# Patient Record
Sex: MALE | Race: WHITE | Employment: OTHER | URBAN - METROPOLITAN AREA
[De-identification: names, ages, dates, MRNs, and addresses within clinical notes are randomized per-mention and may not be internally consistent; named-entity substitution may affect disease eponyms.]

---

## 2021-02-12 ENCOUNTER — IMMUNIZATION (OUTPATIENT)
Dept: PRIMARY CARE CLINIC | Age: 71
End: 2021-02-12
Payer: MEDICARE

## 2021-02-12 PROCEDURE — 0011A PR IMM ADMN SARSCOV2 100 MCG/0.5 ML 1ST DOSE: CPT | Performed by: PHYSICIAN ASSISTANT

## 2021-02-12 PROCEDURE — 91301 COVID-19, MODERNA VACCINE 100MCG/0.5ML DOSE: CPT | Performed by: PHYSICIAN ASSISTANT

## 2021-03-15 ENCOUNTER — IMMUNIZATION (OUTPATIENT)
Dept: PRIMARY CARE CLINIC | Age: 71
End: 2021-03-15
Payer: MEDICARE

## 2021-03-15 PROCEDURE — 91301 COVID-19, MODERNA VACCINE 100MCG/0.5ML DOSE: CPT | Performed by: NURSE PRACTITIONER

## 2021-03-15 PROCEDURE — 0012A COVID-19, MODERNA VACCINE 100MCG/0.5ML DOSE: CPT | Performed by: NURSE PRACTITIONER

## 2021-10-08 ENCOUNTER — HOSPITAL ENCOUNTER (EMERGENCY)
Age: 71
Discharge: ANOTHER HOSPITAL | End: 2021-10-09
Attending: EMERGENCY MEDICINE
Payer: MEDICARE

## 2021-10-08 ENCOUNTER — APPOINTMENT (OUTPATIENT)
Dept: GENERAL RADIOLOGY | Age: 71
End: 2021-10-08
Payer: MEDICARE
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
## 2021-10-08 DIAGNOSIS — S72.001A CLOSED FRACTURE OF RIGHT HIP, INITIAL ENCOUNTER (HCC): Primary | ICD-10-CM

## 2021-10-08 DIAGNOSIS — M25.551 RIGHT HIP PAIN: ICD-10-CM

## 2021-10-08 DIAGNOSIS — S51.011A ELBOW LACERATION, RIGHT, INITIAL ENCOUNTER: ICD-10-CM

## 2021-10-08 LAB
ABO/RH: NORMAL
ALBUMIN SERPL-MCNC: 4.6 G/DL (ref 3.5–5.2)
ALP BLD-CCNC: 72 U/L (ref 40–129)
ALT SERPL-CCNC: 28 U/L (ref 0–40)
ANION GAP SERPL CALCULATED.3IONS-SCNC: 10 MMOL/L (ref 7–16)
ANTIBODY SCREEN: NORMAL
APTT: 26.8 SEC (ref 24.5–35.1)
AST SERPL-CCNC: 30 U/L (ref 0–39)
BASOPHILS ABSOLUTE: 0.07 E9/L (ref 0–0.2)
BASOPHILS RELATIVE PERCENT: 0.6 % (ref 0–2)
BILIRUB SERPL-MCNC: 1.1 MG/DL (ref 0–1.2)
BUN BLDV-MCNC: 18 MG/DL (ref 6–23)
CALCIUM SERPL-MCNC: 10 MG/DL (ref 8.6–10.2)
CHLORIDE BLD-SCNC: 104 MMOL/L (ref 98–107)
CO2: 25 MMOL/L (ref 22–29)
CREAT SERPL-MCNC: 1.1 MG/DL (ref 0.7–1.2)
EOSINOPHILS ABSOLUTE: 0.07 E9/L (ref 0.05–0.5)
EOSINOPHILS RELATIVE PERCENT: 0.6 % (ref 0–6)
GFR AFRICAN AMERICAN: >60
GFR NON-AFRICAN AMERICAN: >60 ML/MIN/1.73
GLUCOSE BLD-MCNC: 131 MG/DL (ref 74–99)
HCT VFR BLD CALC: 46.6 % (ref 37–54)
HEMOGLOBIN: 15.8 G/DL (ref 12.5–16.5)
IMMATURE GRANULOCYTES #: 0.09 E9/L
IMMATURE GRANULOCYTES %: 0.7 % (ref 0–5)
INR BLD: 1.1
LYMPHOCYTES ABSOLUTE: 0.93 E9/L (ref 1.5–4)
LYMPHOCYTES RELATIVE PERCENT: 7.6 % (ref 20–42)
MCH RBC QN AUTO: 30.7 PG (ref 26–35)
MCHC RBC AUTO-ENTMCNC: 33.9 % (ref 32–34.5)
MCV RBC AUTO: 90.7 FL (ref 80–99.9)
MONOCYTES ABSOLUTE: 0.96 E9/L (ref 0.1–0.95)
MONOCYTES RELATIVE PERCENT: 7.9 % (ref 2–12)
NEUTROPHILS ABSOLUTE: 10.1 E9/L (ref 1.8–7.3)
NEUTROPHILS RELATIVE PERCENT: 82.6 % (ref 43–80)
PDW BLD-RTO: 13 FL (ref 11.5–15)
PLATELET # BLD: 104 E9/L (ref 130–450)
PMV BLD AUTO: 11.9 FL (ref 7–12)
POTASSIUM REFLEX MAGNESIUM: 4.5 MMOL/L (ref 3.5–5)
PROTHROMBIN TIME: 12.5 SEC (ref 9.3–12.4)
RBC # BLD: 5.14 E12/L (ref 3.8–5.8)
SODIUM BLD-SCNC: 139 MMOL/L (ref 132–146)
TOTAL PROTEIN: 7.8 G/DL (ref 6.4–8.3)
WBC # BLD: 12.2 E9/L (ref 4.5–11.5)

## 2021-10-08 PROCEDURE — 85025 COMPLETE CBC W/AUTO DIFF WBC: CPT

## 2021-10-08 PROCEDURE — 86850 RBC ANTIBODY SCREEN: CPT

## 2021-10-08 PROCEDURE — 85610 PROTHROMBIN TIME: CPT

## 2021-10-08 PROCEDURE — 86900 BLOOD TYPING SEROLOGIC ABO: CPT

## 2021-10-08 PROCEDURE — 99284 EMERGENCY DEPT VISIT MOD MDM: CPT

## 2021-10-08 PROCEDURE — 96374 THER/PROPH/DIAG INJ IV PUSH: CPT

## 2021-10-08 PROCEDURE — 90471 IMMUNIZATION ADMIN: CPT | Performed by: PHYSICIAN ASSISTANT

## 2021-10-08 PROCEDURE — 12001 RPR S/N/AX/GEN/TRNK 2.5CM/<: CPT

## 2021-10-08 PROCEDURE — 85730 THROMBOPLASTIN TIME PARTIAL: CPT

## 2021-10-08 PROCEDURE — 36415 COLL VENOUS BLD VENIPUNCTURE: CPT

## 2021-10-08 PROCEDURE — 6360000002 HC RX W HCPCS: Performed by: PHYSICIAN ASSISTANT

## 2021-10-08 PROCEDURE — 86901 BLOOD TYPING SEROLOGIC RH(D): CPT

## 2021-10-08 PROCEDURE — 73502 X-RAY EXAM HIP UNI 2-3 VIEWS: CPT

## 2021-10-08 PROCEDURE — 2500000003 HC RX 250 WO HCPCS: Performed by: PHYSICIAN ASSISTANT

## 2021-10-08 PROCEDURE — 80053 COMPREHEN METABOLIC PANEL: CPT

## 2021-10-08 PROCEDURE — 90715 TDAP VACCINE 7 YRS/> IM: CPT | Performed by: PHYSICIAN ASSISTANT

## 2021-10-08 RX ORDER — LIDOCAINE HYDROCHLORIDE 10 MG/ML
20 INJECTION, SOLUTION INFILTRATION; PERINEURAL ONCE
Status: COMPLETED | OUTPATIENT
Start: 2021-10-08 | End: 2021-10-08

## 2021-10-08 RX ORDER — FENTANYL CITRATE 50 UG/ML
50 INJECTION, SOLUTION INTRAMUSCULAR; INTRAVENOUS ONCE
Status: COMPLETED | OUTPATIENT
Start: 2021-10-08 | End: 2021-10-08

## 2021-10-08 RX ADMIN — LIDOCAINE HYDROCHLORIDE 20 ML: 10 INJECTION, SOLUTION INFILTRATION; PERINEURAL at 18:30

## 2021-10-08 RX ADMIN — FENTANYL CITRATE 50 MCG: 0.05 INJECTION, SOLUTION INTRAMUSCULAR; INTRAVENOUS at 19:59

## 2021-10-08 RX ADMIN — TETANUS TOXOID, REDUCED DIPHTHERIA TOXOID AND ACELLULAR PERTUSSIS VACCINE, ADSORBED 0.5 ML: 5; 2.5; 8; 8; 2.5 SUSPENSION INTRAMUSCULAR at 18:30

## 2021-10-08 ASSESSMENT — PAIN DESCRIPTION - PAIN TYPE: TYPE: ACUTE PAIN

## 2021-10-08 ASSESSMENT — PAIN SCALES - GENERAL
PAINLEVEL_OUTOF10: 9

## 2021-10-08 ASSESSMENT — PAIN DESCRIPTION - LOCATION: LOCATION: ELBOW;HIP

## 2021-10-08 ASSESSMENT — PAIN DESCRIPTION - ORIENTATION: ORIENTATION: RIGHT

## 2021-10-08 NOTE — PROGRESS NOTES
1927 called access center to transfer pt to [facility] in [place]  Providers connected (names with credentials):  · Chao Yee / Kapil Roe      Reason for transfer: SNS     Surgery tomorrow - surgery will admit   ED to ED for placement   Femoral head fracture  Accepted      Telephone Order Read Back:  · Accepting provider: Chao Yee   · DX: Right Hip Fracture   · Patient class (IP/OBS): ED  · Level of Care (Type of bed): ED

## 2021-10-08 NOTE — ED PROVIDER NOTES
[facility]     Department of Emergency Medicine   ED  Provider Note  Admit Date/RoomTime: 10/8/2021  6:23 PM  ED Room: /    Chief Complaint   Fall (pt states he fell down 4 steps this afternoon. no HI, no loc, no thinners. right sided elbow pain and hip pain. ), Hip Pain, and Arm Pain    History of Present Illness      Jimena Dockery is a 79 y.o. old male who presents to the emergency department for right hip pain. Patient states he was going down and vacuuming his basement steps backwards when he missed a step and fell onto his right side. He reports pain to his right hip and has laceration to right elbow. He has full range of motion of elbow and denies any numbness/tingling or sensation changes. Patient has full range of motion of all fingers, wrist, elbows, and shoulders. He denies any knee pain, foot/ankle pain, or left hip pain. Patient is denying any worse than usual back pain. He denies hitting his head or having any loss of consciousness. Patient has no headache, dizziness, vision changes, nausea, vomiting, abdominal pain, chest pain, shortness of breath, pain with breathing, or neck pain. He is alert and oriented x3 and in no apparent distress at this exam.  Patient is nontoxic-appearing. He is unsure of his last tetanus shot and will be updated at today's visit. Patient does not take anticoagulation. Patient is politely refusing x-ray for right elbow. [facility]  Ortho: None    ROS   Pertinent positives and negatives are stated within HPI, all other systems reviewed and are negative. Past Medical History: No past medical history on file. Past Surgical History:  has no past surgical history on file. Social History:    Family History: family history is not on file.    Allergies: Shellfish-derived products    Physical Exam     Vitals:    10/08/21 1820   BP: (!) 158/90   Pulse: 98   Resp: 16   Temp: 98.1 °F (36.7 °C)   SpO2: 96%   Weight: 170 lb (77.1 kg)   Height: 5' 6\" (1.676 m) Oxygen Saturation Interpretation: Normal.    Constitutional:  Alert and oriented x3, development consistent with age. NAD  HEENT:  NC/NT. Airway patent. Neck:  Normal ROM. Supple. Non-tender  CVS: Regular rate for age, normal rhythm  Resp: Clear and equal bilaterally with good airflow, no respiratory distress  Back: No lumbar tenderness. Lower Extremity:  Right: hip. Tenderness: Moderate to greater trochanter            Swelling: None. Deformity: No.             ROM: diminished range with pain. Skin:  no erythema, rash or wounds noted, compartments soft and compressible, no calf tenderness or swelling       Distal Function:              Motor deficit: none. Sensory deficit: none. Pulse deficit: none. Capillary refill: normal.  Upper Extremity:1 cm laceration to right elbow, no active bleeding, full range of motion, no crepitus, intact sensations distally, strong radial pulse  Integument:  Normal turgor. Warm, dry, without visible rash, unless noted elsewhere. Neurological: Motor functions intact.      Lab / Imaging Results   (All laboratory and radiology results have been personally reviewed by myself)  Labs:  Results for orders placed or performed during the hospital encounter of 10/08/21   CBC Auto Differential   Result Value Ref Range    WBC 12.2 (H) 4.5 - 11.5 E9/L    RBC 5.14 3.80 - 5.80 E12/L    Hemoglobin 15.8 12.5 - 16.5 g/dL    Hematocrit 46.6 37.0 - 54.0 %    MCV 90.7 80.0 - 99.9 fL    MCH 30.7 26.0 - 35.0 pg    MCHC 33.9 32.0 - 34.5 %    RDW 13.0 11.5 - 15.0 fL    Platelets 637 (L) 946 - 450 E9/L    MPV 11.9 7.0 - 12.0 fL    Neutrophils % 82.6 (H) 43.0 - 80.0 %    Immature Granulocytes % 0.7 0.0 - 5.0 %    Lymphocytes % 7.6 (L) 20.0 - 42.0 %    Monocytes % 7.9 2.0 - 12.0 %    Eosinophils % 0.6 0.0 - 6.0 %    Basophils % 0.6 0.0 - 2.0 %    Neutrophils Absolute 10.10 (H) 1.80 - 7.30 E9/L    Immature Granulocytes # 0.09 E9/L Lymphocytes Absolute 0.93 (L) 1.50 - 4.00 E9/L    Monocytes Absolute 0.96 (H) 0.10 - 0.95 E9/L    Eosinophils Absolute 0.07 0.05 - 0.50 E9/L    Basophils Absolute 0.07 0.00 - 0.20 E9/L   Comprehensive Metabolic Panel w/ Reflex to MG   Result Value Ref Range    Sodium 139 132 - 146 mmol/L    Potassium reflex Magnesium 4.5 3.5 - 5.0 mmol/L    Chloride 104 98 - 107 mmol/L    CO2 25 22 - 29 mmol/L    Anion Gap 10 7 - 16 mmol/L    Glucose 131 (H) 74 - 99 mg/dL    BUN 18 6 - 23 mg/dL    CREATININE 1.1 0.7 - 1.2 mg/dL    GFR Non-African American >60 >=60 mL/min/1.73    GFR African American >60     Calcium 10.0 8.6 - 10.2 mg/dL    Total Protein 7.8 6.4 - 8.3 g/dL    Albumin 4.6 3.5 - 5.2 g/dL    Total Bilirubin 1.1 0.0 - 1.2 mg/dL    Alkaline Phosphatase 72 40 - 129 U/L    ALT 28 0 - 40 U/L    AST 30 0 - 39 U/L   Protime-INR   Result Value Ref Range    Protime 12.5 (H) 9.3 - 12.4 sec    INR 1.1    APTT   Result Value Ref Range    aPTT 26.8 24.5 - 35.1 sec   TYPE AND SCREEN   Result Value Ref Range    ABO/Rh B POS     Antibody Screen NEG      Imaging: All Radiology results interpreted by Radiologist unless otherwise noted. XR HIP 2-3 VW W PELVIS RIGHT   Final Result   Right femoral neck fracture.            ED Course / Medical Decision Making     Medications   Tetanus-Diphth-Acell Pertussis (BOOSTRIX) injection 0.5 mL (0.5 mLs IntraMUSCular Given 10/8/21 1830)   lidocaine 1 % injection 20 mL (20 mLs IntraDERmal Given 10/8/21 1830)   fentaNYL (SUBLIMAZE) injection 50 mcg (50 mcg IntraVENous Given 10/8/21 1959)      Consult(s):  IP CONSULT TO ORTHOPEDIC SURGERY   Spoke with Dr. Kwame Monroy who accepted patient and states he will admit patient himself  [id number] [address] for ED to ED transfer since there are no beds for direct admit     [facility] with Dr. Esme Karla who is aware of patients case and accepted to ED    Re-examination:  Patient aware of XR results and needs for transfer downtown for surgery tomorrow  Patient

## 2021-10-09 ENCOUNTER — APPOINTMENT (OUTPATIENT)
Dept: GENERAL RADIOLOGY | Age: 71
DRG: 522 | End: 2021-10-09
Payer: OTHER GOVERNMENT

## 2021-10-09 ENCOUNTER — HOSPITAL ENCOUNTER (INPATIENT)
Age: 71
LOS: 2 days | Discharge: HOME HEALTH | DRG: 522 | End: 2021-10-11
Attending: EMERGENCY MEDICINE | Admitting: ORTHOPAEDIC SURGERY
Payer: OTHER GOVERNMENT

## 2021-10-09 ENCOUNTER — ANESTHESIA EVENT (OUTPATIENT)
Dept: OPERATING ROOM | Age: 71
DRG: 522 | End: 2021-10-09
Payer: OTHER GOVERNMENT

## 2021-10-09 ENCOUNTER — ANESTHESIA (OUTPATIENT)
Dept: OPERATING ROOM | Age: 71
DRG: 522 | End: 2021-10-09
Payer: OTHER GOVERNMENT

## 2021-10-09 VITALS
DIASTOLIC BLOOD PRESSURE: 81 MMHG | HEART RATE: 72 BPM | SYSTOLIC BLOOD PRESSURE: 146 MMHG | RESPIRATION RATE: 16 BRPM | HEIGHT: 66 IN | BODY MASS INDEX: 27.32 KG/M2 | OXYGEN SATURATION: 98 % | WEIGHT: 170 LBS | TEMPERATURE: 98.8 F

## 2021-10-09 VITALS — OXYGEN SATURATION: 97 % | SYSTOLIC BLOOD PRESSURE: 137 MMHG | DIASTOLIC BLOOD PRESSURE: 82 MMHG | TEMPERATURE: 97.9 F

## 2021-10-09 DIAGNOSIS — S72.001A CLOSED FRACTURE OF RIGHT HIP, INITIAL ENCOUNTER (HCC): Primary | ICD-10-CM

## 2021-10-09 LAB
EKG ATRIAL RATE: 66 BPM
EKG P AXIS: 16 DEGREES
EKG P-R INTERVAL: 152 MS
EKG Q-T INTERVAL: 400 MS
EKG QRS DURATION: 98 MS
EKG QTC CALCULATION (BAZETT): 419 MS
EKG R AXIS: -37 DEGREES
EKG T AXIS: 13 DEGREES
EKG VENTRICULAR RATE: 66 BPM

## 2021-10-09 PROCEDURE — 6360000002 HC RX W HCPCS

## 2021-10-09 PROCEDURE — 73552 X-RAY EXAM OF FEMUR 2/>: CPT

## 2021-10-09 PROCEDURE — 27236 TREAT THIGH FRACTURE: CPT | Performed by: ORTHOPAEDIC SURGERY

## 2021-10-09 PROCEDURE — 6370000000 HC RX 637 (ALT 250 FOR IP): Performed by: STUDENT IN AN ORGANIZED HEALTH CARE EDUCATION/TRAINING PROGRAM

## 2021-10-09 PROCEDURE — 99223 1ST HOSP IP/OBS HIGH 75: CPT | Performed by: ORTHOPAEDIC SURGERY

## 2021-10-09 PROCEDURE — 88305 TISSUE EXAM BY PATHOLOGIST: CPT

## 2021-10-09 PROCEDURE — 2580000003 HC RX 258: Performed by: STUDENT IN AN ORGANIZED HEALTH CARE EDUCATION/TRAINING PROGRAM

## 2021-10-09 PROCEDURE — 7100000000 HC PACU RECOVERY - FIRST 15 MIN: Performed by: ORTHOPAEDIC SURGERY

## 2021-10-09 PROCEDURE — 2709999900 HC NON-CHARGEABLE SUPPLY: Performed by: ORTHOPAEDIC SURGERY

## 2021-10-09 PROCEDURE — 51798 US URINE CAPACITY MEASURE: CPT

## 2021-10-09 PROCEDURE — 71045 X-RAY EXAM CHEST 1 VIEW: CPT

## 2021-10-09 PROCEDURE — 73080 X-RAY EXAM OF ELBOW: CPT

## 2021-10-09 PROCEDURE — 2500000003 HC RX 250 WO HCPCS: Performed by: STUDENT IN AN ORGANIZED HEALTH CARE EDUCATION/TRAINING PROGRAM

## 2021-10-09 PROCEDURE — 51701 INSERT BLADDER CATHETER: CPT

## 2021-10-09 PROCEDURE — 7100000001 HC PACU RECOVERY - ADDTL 15 MIN: Performed by: ORTHOPAEDIC SURGERY

## 2021-10-09 PROCEDURE — 3600000005 HC SURGERY LEVEL 5 BASE: Performed by: ORTHOPAEDIC SURGERY

## 2021-10-09 PROCEDURE — 88311 DECALCIFY TISSUE: CPT

## 2021-10-09 PROCEDURE — 1200000000 HC SEMI PRIVATE

## 2021-10-09 PROCEDURE — 73502 X-RAY EXAM HIP UNI 2-3 VIEWS: CPT

## 2021-10-09 PROCEDURE — 2580000003 HC RX 258

## 2021-10-09 PROCEDURE — 93010 ELECTROCARDIOGRAM REPORT: CPT | Performed by: INTERNAL MEDICINE

## 2021-10-09 PROCEDURE — C1776 JOINT DEVICE (IMPLANTABLE): HCPCS | Performed by: ORTHOPAEDIC SURGERY

## 2021-10-09 PROCEDURE — 3700000000 HC ANESTHESIA ATTENDED CARE: Performed by: ORTHOPAEDIC SURGERY

## 2021-10-09 PROCEDURE — 6360000002 HC RX W HCPCS: Performed by: ORTHOPAEDIC SURGERY

## 2021-10-09 PROCEDURE — 99284 EMERGENCY DEPT VISIT MOD MDM: CPT

## 2021-10-09 PROCEDURE — 72170 X-RAY EXAM OF PELVIS: CPT

## 2021-10-09 PROCEDURE — 6370000000 HC RX 637 (ALT 250 FOR IP): Performed by: ORTHOPAEDIC SURGERY

## 2021-10-09 PROCEDURE — 2500000003 HC RX 250 WO HCPCS

## 2021-10-09 PROCEDURE — 6370000000 HC RX 637 (ALT 250 FOR IP): Performed by: PHYSICIAN ASSISTANT

## 2021-10-09 PROCEDURE — 3700000001 HC ADD 15 MINUTES (ANESTHESIA): Performed by: ORTHOPAEDIC SURGERY

## 2021-10-09 PROCEDURE — 3600000015 HC SURGERY LEVEL 5 ADDTL 15MIN: Performed by: ORTHOPAEDIC SURGERY

## 2021-10-09 PROCEDURE — 6360000002 HC RX W HCPCS: Performed by: STUDENT IN AN ORGANIZED HEALTH CARE EDUCATION/TRAINING PROGRAM

## 2021-10-09 PROCEDURE — 93005 ELECTROCARDIOGRAM TRACING: CPT | Performed by: STUDENT IN AN ORGANIZED HEALTH CARE EDUCATION/TRAINING PROGRAM

## 2021-10-09 PROCEDURE — 0SRR0JZ REPLACEMENT OF RIGHT HIP JOINT, FEMORAL SURFACE WITH SYNTHETIC SUBSTITUTE, OPEN APPROACH: ICD-10-PCS | Performed by: ORTHOPAEDIC SURGERY

## 2021-10-09 DEVICE — HEAD FEM OD51MM ID26MM HIP CO CHROM POLYETH BPLR CEMENTLESS: Type: IMPLANTABLE DEVICE | Site: HIP | Status: FUNCTIONAL

## 2021-10-09 DEVICE — STEM FEM SZ 5 L108MM NK L35MM 40MM OFFSET 132DEG HIP TI: Type: IMPLANTABLE DEVICE | Site: HIP | Status: FUNCTIONAL

## 2021-10-09 DEVICE — IMPL HIP FEM HEAD LFIT V40 26MM + 4MM: Type: IMPLANTABLE DEVICE | Site: HIP | Status: FUNCTIONAL

## 2021-10-09 RX ORDER — MIDAZOLAM HYDROCHLORIDE 1 MG/ML
INJECTION INTRAMUSCULAR; INTRAVENOUS PRN
Status: DISCONTINUED | OUTPATIENT
Start: 2021-10-09 | End: 2021-10-09 | Stop reason: SDUPTHER

## 2021-10-09 RX ORDER — ONDANSETRON 2 MG/ML
4 INJECTION INTRAMUSCULAR; INTRAVENOUS EVERY 6 HOURS PRN
Status: DISCONTINUED | OUTPATIENT
Start: 2021-10-09 | End: 2021-10-11 | Stop reason: HOSPADM

## 2021-10-09 RX ORDER — OXYCODONE HYDROCHLORIDE AND ACETAMINOPHEN 5; 325 MG/1; MG/1
2 TABLET ORAL PRN
Status: DISCONTINUED | OUTPATIENT
Start: 2021-10-09 | End: 2021-10-09 | Stop reason: HOSPADM

## 2021-10-09 RX ORDER — NEOSTIGMINE METHYLSULFATE 1 MG/ML
INJECTION, SOLUTION INTRAVENOUS PRN
Status: DISCONTINUED | OUTPATIENT
Start: 2021-10-09 | End: 2021-10-09 | Stop reason: SDUPTHER

## 2021-10-09 RX ORDER — MORPHINE SULFATE 4 MG/ML
4 INJECTION, SOLUTION INTRAMUSCULAR; INTRAVENOUS
Status: DISCONTINUED | OUTPATIENT
Start: 2021-10-09 | End: 2021-10-09

## 2021-10-09 RX ORDER — ROCURONIUM BROMIDE 10 MG/ML
INJECTION, SOLUTION INTRAVENOUS PRN
Status: DISCONTINUED | OUTPATIENT
Start: 2021-10-09 | End: 2021-10-09 | Stop reason: SDUPTHER

## 2021-10-09 RX ORDER — OXYCODONE HYDROCHLORIDE 5 MG/1
10 TABLET ORAL EVERY 4 HOURS PRN
Status: DISCONTINUED | OUTPATIENT
Start: 2021-10-09 | End: 2021-10-11 | Stop reason: HOSPADM

## 2021-10-09 RX ORDER — SODIUM CHLORIDE 0.9 % (FLUSH) 0.9 %
5-40 SYRINGE (ML) INJECTION EVERY 12 HOURS SCHEDULED
Status: DISCONTINUED | OUTPATIENT
Start: 2021-10-09 | End: 2021-10-11 | Stop reason: HOSPADM

## 2021-10-09 RX ORDER — OXYCODONE HYDROCHLORIDE 5 MG/1
5 TABLET ORAL EVERY 4 HOURS PRN
Status: DISCONTINUED | OUTPATIENT
Start: 2021-10-09 | End: 2021-10-11 | Stop reason: HOSPADM

## 2021-10-09 RX ORDER — ONDANSETRON 2 MG/ML
INJECTION INTRAMUSCULAR; INTRAVENOUS PRN
Status: DISCONTINUED | OUTPATIENT
Start: 2021-10-09 | End: 2021-10-09 | Stop reason: SDUPTHER

## 2021-10-09 RX ORDER — SODIUM CHLORIDE 0.9 % (FLUSH) 0.9 %
5-40 SYRINGE (ML) INJECTION EVERY 12 HOURS SCHEDULED
Status: DISCONTINUED | OUTPATIENT
Start: 2021-10-09 | End: 2021-10-09 | Stop reason: SDUPTHER

## 2021-10-09 RX ORDER — SODIUM CHLORIDE 0.9 % (FLUSH) 0.9 %
5-40 SYRINGE (ML) INJECTION PRN
Status: DISCONTINUED | OUTPATIENT
Start: 2021-10-09 | End: 2021-10-09 | Stop reason: SDUPTHER

## 2021-10-09 RX ORDER — MORPHINE SULFATE 2 MG/ML
2 INJECTION, SOLUTION INTRAMUSCULAR; INTRAVENOUS
Status: DISCONTINUED | OUTPATIENT
Start: 2021-10-09 | End: 2021-10-11 | Stop reason: HOSPADM

## 2021-10-09 RX ORDER — MORPHINE SULFATE 2 MG/ML
2 INJECTION, SOLUTION INTRAMUSCULAR; INTRAVENOUS EVERY 5 MIN PRN
Status: DISCONTINUED | OUTPATIENT
Start: 2021-10-09 | End: 2021-10-09 | Stop reason: HOSPADM

## 2021-10-09 RX ORDER — SODIUM CHLORIDE 9 MG/ML
25 INJECTION, SOLUTION INTRAVENOUS PRN
Status: DISCONTINUED | OUTPATIENT
Start: 2021-10-09 | End: 2021-10-11 | Stop reason: HOSPADM

## 2021-10-09 RX ORDER — MORPHINE SULFATE 2 MG/ML
1 INJECTION, SOLUTION INTRAMUSCULAR; INTRAVENOUS EVERY 5 MIN PRN
Status: DISCONTINUED | OUTPATIENT
Start: 2021-10-09 | End: 2021-10-09 | Stop reason: HOSPADM

## 2021-10-09 RX ORDER — POLYETHYLENE GLYCOL 3350 17 G/17G
17 POWDER, FOR SOLUTION ORAL DAILY PRN
Status: DISCONTINUED | OUTPATIENT
Start: 2021-10-09 | End: 2021-10-11 | Stop reason: HOSPADM

## 2021-10-09 RX ORDER — PROPOFOL 10 MG/ML
INJECTION, EMULSION INTRAVENOUS PRN
Status: DISCONTINUED | OUTPATIENT
Start: 2021-10-09 | End: 2021-10-09 | Stop reason: SDUPTHER

## 2021-10-09 RX ORDER — LIDOCAINE HYDROCHLORIDE 20 MG/ML
INJECTION, SOLUTION INTRAVENOUS PRN
Status: DISCONTINUED | OUTPATIENT
Start: 2021-10-09 | End: 2021-10-09 | Stop reason: SDUPTHER

## 2021-10-09 RX ORDER — OXYCODONE HYDROCHLORIDE AND ACETAMINOPHEN 5; 325 MG/1; MG/1
1 TABLET ORAL EVERY 6 HOURS PRN
Status: DISCONTINUED | OUTPATIENT
Start: 2021-10-09 | End: 2021-10-09

## 2021-10-09 RX ORDER — PHENYLEPHRINE HCL IN 0.9% NACL 1 MG/10 ML
SYRINGE (ML) INTRAVENOUS PRN
Status: DISCONTINUED | OUTPATIENT
Start: 2021-10-09 | End: 2021-10-09 | Stop reason: SDUPTHER

## 2021-10-09 RX ORDER — MORPHINE SULFATE 4 MG/ML
4 INJECTION, SOLUTION INTRAMUSCULAR; INTRAVENOUS
Status: DISCONTINUED | OUTPATIENT
Start: 2021-10-09 | End: 2021-10-11 | Stop reason: HOSPADM

## 2021-10-09 RX ORDER — SODIUM CHLORIDE, SODIUM LACTATE, POTASSIUM CHLORIDE, CALCIUM CHLORIDE 600; 310; 30; 20 MG/100ML; MG/100ML; MG/100ML; MG/100ML
INJECTION, SOLUTION INTRAVENOUS CONTINUOUS PRN
Status: DISCONTINUED | OUTPATIENT
Start: 2021-10-09 | End: 2021-10-09 | Stop reason: SDUPTHER

## 2021-10-09 RX ORDER — SODIUM CHLORIDE 0.9 % (FLUSH) 0.9 %
5-40 SYRINGE (ML) INJECTION PRN
Status: DISCONTINUED | OUTPATIENT
Start: 2021-10-09 | End: 2021-10-11 | Stop reason: HOSPADM

## 2021-10-09 RX ORDER — FENTANYL CITRATE 50 UG/ML
INJECTION, SOLUTION INTRAMUSCULAR; INTRAVENOUS PRN
Status: DISCONTINUED | OUTPATIENT
Start: 2021-10-09 | End: 2021-10-09 | Stop reason: SDUPTHER

## 2021-10-09 RX ORDER — SODIUM CHLORIDE 9 MG/ML
25 INJECTION, SOLUTION INTRAVENOUS PRN
Status: DISCONTINUED | OUTPATIENT
Start: 2021-10-09 | End: 2021-10-09 | Stop reason: SDUPTHER

## 2021-10-09 RX ORDER — SODIUM CHLORIDE 9 MG/ML
INJECTION, SOLUTION INTRAVENOUS CONTINUOUS PRN
Status: DISCONTINUED | OUTPATIENT
Start: 2021-10-09 | End: 2021-10-09 | Stop reason: SDUPTHER

## 2021-10-09 RX ORDER — OXYCODONE HYDROCHLORIDE AND ACETAMINOPHEN 5; 325 MG/1; MG/1
1 TABLET ORAL PRN
Status: DISCONTINUED | OUTPATIENT
Start: 2021-10-09 | End: 2021-10-09 | Stop reason: HOSPADM

## 2021-10-09 RX ORDER — MEPERIDINE HYDROCHLORIDE 25 MG/ML
12.5 INJECTION INTRAMUSCULAR; INTRAVENOUS; SUBCUTANEOUS
Status: DISCONTINUED | OUTPATIENT
Start: 2021-10-09 | End: 2021-10-09 | Stop reason: HOSPADM

## 2021-10-09 RX ORDER — LABETALOL HYDROCHLORIDE 5 MG/ML
INJECTION, SOLUTION INTRAVENOUS PRN
Status: DISCONTINUED | OUTPATIENT
Start: 2021-10-09 | End: 2021-10-09 | Stop reason: SDUPTHER

## 2021-10-09 RX ORDER — OXYCODONE HYDROCHLORIDE AND ACETAMINOPHEN 5; 325 MG/1; MG/1
1 TABLET ORAL EVERY 6 HOURS PRN
Qty: 28 TABLET | Refills: 0 | Status: SHIPPED | OUTPATIENT
Start: 2021-10-09 | End: 2021-10-16

## 2021-10-09 RX ORDER — TOBRAMYCIN 1.2 G/30ML
INJECTION, POWDER, LYOPHILIZED, FOR SOLUTION INTRAVENOUS PRN
Status: DISCONTINUED | OUTPATIENT
Start: 2021-10-09 | End: 2021-10-09 | Stop reason: ALTCHOICE

## 2021-10-09 RX ORDER — GLYCOPYRROLATE 1 MG/5 ML
SYRINGE (ML) INTRAVENOUS PRN
Status: DISCONTINUED | OUTPATIENT
Start: 2021-10-09 | End: 2021-10-09 | Stop reason: SDUPTHER

## 2021-10-09 RX ORDER — ACETAMINOPHEN 500 MG
1000 TABLET ORAL ONCE
Status: COMPLETED | OUTPATIENT
Start: 2021-10-09 | End: 2021-10-09

## 2021-10-09 RX ORDER — ONDANSETRON 2 MG/ML
4 INJECTION INTRAMUSCULAR; INTRAVENOUS
Status: DISCONTINUED | OUTPATIENT
Start: 2021-10-09 | End: 2021-10-09 | Stop reason: HOSPADM

## 2021-10-09 RX ORDER — DEXAMETHASONE SODIUM PHOSPHATE 10 MG/ML
INJECTION INTRAMUSCULAR; INTRAVENOUS PRN
Status: DISCONTINUED | OUTPATIENT
Start: 2021-10-09 | End: 2021-10-09 | Stop reason: SDUPTHER

## 2021-10-09 RX ADMIN — Medication 0.6 MG: at 14:31

## 2021-10-09 RX ADMIN — Medication 100 MCG: at 14:24

## 2021-10-09 RX ADMIN — FENTANYL CITRATE 150 MCG: 50 INJECTION, SOLUTION INTRAMUSCULAR; INTRAVENOUS at 13:16

## 2021-10-09 RX ADMIN — Medication 5 ML: at 10:13

## 2021-10-09 RX ADMIN — Medication 3 MG: at 14:31

## 2021-10-09 RX ADMIN — FENTANYL CITRATE 50 MCG: 50 INJECTION, SOLUTION INTRAMUSCULAR; INTRAVENOUS at 13:38

## 2021-10-09 RX ADMIN — LABETALOL HYDROCHLORIDE 5 MG: 5 INJECTION INTRAVENOUS at 13:45

## 2021-10-09 RX ADMIN — TRANEXAMIC ACID 1000 MG: 1 INJECTION, SOLUTION INTRAVENOUS at 13:27

## 2021-10-09 RX ADMIN — SODIUM CHLORIDE, POTASSIUM CHLORIDE, SODIUM LACTATE AND CALCIUM CHLORIDE: 600; 310; 30; 20 INJECTION, SOLUTION INTRAVENOUS at 14:28

## 2021-10-09 RX ADMIN — DEXAMETHASONE SODIUM PHOSPHATE 10 MG: 10 INJECTION INTRAMUSCULAR; INTRAVENOUS at 13:20

## 2021-10-09 RX ADMIN — FENTANYL CITRATE 50 MCG: 50 INJECTION, SOLUTION INTRAMUSCULAR; INTRAVENOUS at 13:57

## 2021-10-09 RX ADMIN — Medication 2 G: at 13:20

## 2021-10-09 RX ADMIN — ROCURONIUM BROMIDE 40 MG: 10 INJECTION, SOLUTION INTRAVENOUS at 13:16

## 2021-10-09 RX ADMIN — SODIUM CHLORIDE: 9 INJECTION, SOLUTION INTRAVENOUS at 13:12

## 2021-10-09 RX ADMIN — MORPHINE SULFATE 4 MG: 4 INJECTION, SOLUTION INTRAMUSCULAR; INTRAVENOUS at 09:42

## 2021-10-09 RX ADMIN — Medication 100 MCG: at 14:08

## 2021-10-09 RX ADMIN — SODIUM CHLORIDE, PRESERVATIVE FREE 10 ML: 5 INJECTION INTRAVENOUS at 22:43

## 2021-10-09 RX ADMIN — CEFAZOLIN 2000 MG: 10 INJECTION, POWDER, FOR SOLUTION INTRAVENOUS at 22:42

## 2021-10-09 RX ADMIN — MIDAZOLAM 2 MG: 1 INJECTION INTRAMUSCULAR; INTRAVENOUS at 13:12

## 2021-10-09 RX ADMIN — PROPOFOL 120 MG: 10 INJECTION, EMULSION INTRAVENOUS at 13:16

## 2021-10-09 RX ADMIN — LIDOCAINE HYDROCHLORIDE 100 MG: 20 INJECTION, SOLUTION INTRAVENOUS at 13:16

## 2021-10-09 RX ADMIN — MORPHINE SULFATE 4 MG: 4 INJECTION, SOLUTION INTRAMUSCULAR; INTRAVENOUS at 06:15

## 2021-10-09 RX ADMIN — FENTANYL CITRATE 50 MCG: 50 INJECTION, SOLUTION INTRAMUSCULAR; INTRAVENOUS at 13:46

## 2021-10-09 RX ADMIN — ACETAMINOPHEN 1000 MG: 500 TABLET ORAL at 03:05

## 2021-10-09 RX ADMIN — ASPIRIN 325 MG: 325 TABLET, COATED ORAL at 22:43

## 2021-10-09 RX ADMIN — LABETALOL HYDROCHLORIDE 5 MG: 5 INJECTION INTRAVENOUS at 13:40

## 2021-10-09 RX ADMIN — ONDANSETRON HYDROCHLORIDE 4 MG: 2 INJECTION, SOLUTION INTRAMUSCULAR; INTRAVENOUS at 14:31

## 2021-10-09 ASSESSMENT — PULMONARY FUNCTION TESTS
PIF_VALUE: 17
PIF_VALUE: 21
PIF_VALUE: 15
PIF_VALUE: 22
PIF_VALUE: 21
PIF_VALUE: 22
PIF_VALUE: 21
PIF_VALUE: 19
PIF_VALUE: 22
PIF_VALUE: 20
PIF_VALUE: 23
PIF_VALUE: 1
PIF_VALUE: 25
PIF_VALUE: 22
PIF_VALUE: 45
PIF_VALUE: 23
PIF_VALUE: 21
PIF_VALUE: 23
PIF_VALUE: 0
PIF_VALUE: 21
PIF_VALUE: 19
PIF_VALUE: 22
PIF_VALUE: 1
PIF_VALUE: 22
PIF_VALUE: 17
PIF_VALUE: 2
PIF_VALUE: 22
PIF_VALUE: 0
PIF_VALUE: 22
PIF_VALUE: 24
PIF_VALUE: 22
PIF_VALUE: 23
PIF_VALUE: 22
PIF_VALUE: 16
PIF_VALUE: 20
PIF_VALUE: 28
PIF_VALUE: 22
PIF_VALUE: 20
PIF_VALUE: 22
PIF_VALUE: 1
PIF_VALUE: 21
PIF_VALUE: 7
PIF_VALUE: 9
PIF_VALUE: 21
PIF_VALUE: 22
PIF_VALUE: 23
PIF_VALUE: 18
PIF_VALUE: 22
PIF_VALUE: 22
PIF_VALUE: 23
PIF_VALUE: 22
PIF_VALUE: 20
PIF_VALUE: 2
PIF_VALUE: 14
PIF_VALUE: 21
PIF_VALUE: 1
PIF_VALUE: 22
PIF_VALUE: 1
PIF_VALUE: 22
PIF_VALUE: 21
PIF_VALUE: 21
PIF_VALUE: 8
PIF_VALUE: 22
PIF_VALUE: 21
PIF_VALUE: 3
PIF_VALUE: 17
PIF_VALUE: 5
PIF_VALUE: 21
PIF_VALUE: 22
PIF_VALUE: 17
PIF_VALUE: 22
PIF_VALUE: 17
PIF_VALUE: 22
PIF_VALUE: 22
PIF_VALUE: 2
PIF_VALUE: 22
PIF_VALUE: 21
PIF_VALUE: 17
PIF_VALUE: 3
PIF_VALUE: 40
PIF_VALUE: 22
PIF_VALUE: 23
PIF_VALUE: 22
PIF_VALUE: 45
PIF_VALUE: 20
PIF_VALUE: 21
PIF_VALUE: 21
PIF_VALUE: 23
PIF_VALUE: 21
PIF_VALUE: 23

## 2021-10-09 ASSESSMENT — PAIN SCALES - GENERAL
PAINLEVEL_OUTOF10: 0
PAINLEVEL_OUTOF10: 4
PAINLEVEL_OUTOF10: 10
PAINLEVEL_OUTOF10: 0
PAINLEVEL_OUTOF10: 10
PAINLEVEL_OUTOF10: 0
PAINLEVEL_OUTOF10: 10
PAINLEVEL_OUTOF10: 7
PAINLEVEL_OUTOF10: 0
PAINLEVEL_OUTOF10: 0
PAINLEVEL_OUTOF10: 7
PAINLEVEL_OUTOF10: 0

## 2021-10-09 ASSESSMENT — PAIN DESCRIPTION - PAIN TYPE
TYPE: ACUTE PAIN

## 2021-10-09 ASSESSMENT — PAIN DESCRIPTION - FREQUENCY
FREQUENCY: CONTINUOUS

## 2021-10-09 ASSESSMENT — PAIN DESCRIPTION - ORIENTATION
ORIENTATION: RIGHT

## 2021-10-09 ASSESSMENT — PAIN DESCRIPTION - LOCATION
LOCATION: HIP
LOCATION: ELBOW;HIP
LOCATION: ELBOW;HIP

## 2021-10-09 NOTE — CONSULTS
Hospital Medicine  Consult History & Physical        Reason for consult:  Medical managment    Date of Service: Pt seen/examined in consultation on 10/9/2021    History Of Present Illness:    Mr. Natalie Moreno is a 79y.o. year old male  who  has no past medical history on file. He presented to the [facility] on 10/8/21 after a fall at home. He reported that he fell down 4 steps and has since been having right sided elbow and hip pain. A 1 cm laceration to the right elbow was repaired in ED. Xray revealed a right femoral neck fracture. He was transferred to [facility] for orthopedic surgery. He is for surgery today 10/9/21. We were consulted for medical management. He has no past medical history and takes no medications. He told nursing that he has not seen a doctor in years but tells me that he follows with a physician at the [place]. Past Medical History:    History reviewed. No pertinent past medical history. Past Surgical History:    History reviewed. No pertinent surgical history. Medications Prior to Admission:    Prior to Admission medications    Not on File       Allergies:  Shellfish-derived products    Social History:      TOBACCO:   reports that he has never smoked. His smokeless tobacco use includes chew. ETOH:   reports current alcohol use. Family History:     Positive as follows:    History reviewed. No pertinent family history. REVIEW OF SYSTEMS:   Pertinent positives as noted in the HPI. All other systems reviewed and negative. PHYSICAL EXAM:  BP (!) 143/76   Pulse 66   Temp 97.2 °F (36.2 °C) (Temporal)   Resp 17   Ht 5' 6\" (1.676 m)   Wt 170 lb (77.1 kg)   SpO2 95%   BMI 27.44 kg/m²   General appearance: No apparent distress, appears stated age and cooperative. HEENT: Normal cephalic, atraumatic without obvious deformity. Pupils equal, round, and reactive to light. Neck: Supple, with full range of motion. No jugular venous distention.  Trachea midline. Respiratory:  Clear to auscultation bilaterally. No apparent distress. Cardiovascular:  Regular rate and rhythm. Loud pansystolic murmur noted. PV: Brisk capillary refill. +2 pedal and radial pulses bilaterally. No clubbing, cyanosis, edema of bilateral lower extremities. Abdomen: Soft, non-tender, non-distended. +BS  Musculoskeletal: No obvious deformities or erythematous or edematous joints. R elbow with DSD. Skin: Normal skin color. No rashes or lesions. Neurologic:  Neurovascularly intact without any focal sensory/motor deficits. Psychiatric: Calm and cooperative    Labs:     CBC:   Recent Labs     10/08/21  1926   WBC 12.2*   RBC 5.14   HGB 15.8   HCT 46.6   MCV 90.7   RDW 13.0   *     BMP:   Recent Labs     10/08/21  1926      K 4.5      CO2 25   BUN 18   CREATININE 1.1     LFT:  Recent Labs     10/08/21  1926   PROT 7.8   ALKPHOS 72   ALT 28   AST 30   BILITOT 1.1     CE:  No results for input(s): Wayna Khat in the last 72 hours. PT/INR:   Recent Labs     10/08/21  1926   INR 1.1   APTT 26.8     BNP: No results for input(s): BNP in the last 72 hours. Hgb A1C: No results found for: LABA1C  No results found for: EAG  ESR: No results found for: SEDRATE  CRP: No results found for: CRP  D Dimer: No results found for: DDIMER  Folate and B12: No results found for: SBUUBHDT93, No results found for: FOLATE  Lactic Acid: No results found for: LACTA  Thyroid Studies: No results found for: TSH, Q2TDADV, L1SWXWZ, THYROIDAB      ASSESSMENT/PLAN:    Right femoral neck fracture- for surgery today with orthopedic surgery. Perioperative management per attending. HTN- mild, on no medication at home. .Elevated pressures correlating with high pain scores. Continue PRN analgesia as ordered. Pansystolic murmur- patient states that he has never been told he has a murmur before. He and his daughter do report that he gets dizzy and short of breath fairly often.  Will obtain

## 2021-10-09 NOTE — ANESTHESIA PRE PROCEDURE
Department of Anesthesiology  Preprocedure Note       Name:  Sophia Richardson   Age:  79 y.o.  :  1950                                          MRN:  [id number]         Date:  10/9/2021      Surgeon: Lisa Guzmán):  Dianna Muniz MD    Procedure: Procedure(s):  RIGHT HIP HEMIARTHROPLASTY    Medications prior to admission:   Prior to Admission medications    Not on File       Current medications:    Current Facility-Administered Medications   Medication Dose Route Frequency Provider Last Rate Last Admin    oxyCODONE-acetaminophen (PERCOCET) 5-325 MG per tablet 1 tablet  1 tablet Oral Q6H PRN Fonnie Fontan, DO        morphine sulfate (PF) injection 4 mg  4 mg IntraVENous Q3H PRN Fonnie Fontan, DO   4 mg at 10/09/21 0942    ondansetron (ZOFRAN) injection 4 mg  4 mg IntraVENous Q6H PRN Fonnie Fontan, DO        sodium chloride flush 0.9 % injection 5-40 mL  5-40 mL IntraVENous 2 times per day Fonnie Fontan, DO   5 mL at 10/09/21 1013    sodium chloride flush 0.9 % injection 5-40 mL  5-40 mL IntraVENous PRN Fonnie Fontan, DO        0.9 % sodium chloride infusion  25 mL IntraVENous PRN Fonnie Fontan, DO        polyethylene glycol (GLYCOLAX) packet 17 g  17 g Oral Daily PRN Fonnie Fontan, DO        tobramycin (NEBCIN) injection    PRN Dianna Muniz MD   1.2 g at 10/09/21 1355    povidone-iodine 5 % ophthalmic solution    PRN Dianna Muniz MD   30 mL at 10/09/21 1355    sodium chloride bacteriostatic 0.9% 30 mL with bupivacaine (MARCAINE) 30 mL, ketorolac (TORADOL) 30 mg    PRN Dianna Muniz MD   61 mL at 10/09/21 1357    tranexamic acid (CYKLOKAPRON) 1,000 mg in dextrose 5 % 100 mL IVPB  1,000 mg IntraVENous On Call to [address] [place] [address], DO         Facility-Administered Medications Ordered in Other Encounters   Medication Dose Route Frequency Provider Last Rate Last Admin    midazolam (VERSED) injection   IntraVENous PRN use: Yes     Comment: couple glasses daily                                 Ready to quit: Not Answered  Counseling given: Yes      Vital Signs (Current):   Vitals:    10/09/21 0431 10/09/21 0500 10/09/21 0600 10/09/21 0846   BP: 130/87 128/79 (!) 145/88 (!) 143/76   Pulse: 68 62 70 66   Resp: 16   17   Temp:    97.2 °F (36.2 °C)   TempSrc:    Temporal   SpO2: 98% 96% 95% 95%   Weight:       Height:                                                  BP Readings from Last 3 Encounters:   10/09/21 (!) 143/76   10/09/21 137/82   10/09/21 (!) 146/81       NPO Status: Time of last liquid consumption: 0000                        Time of last solid consumption: 0000                        Date of last liquid consumption: 10/09/21                        Date of last solid food consumption: 10/09/21    BMI:   Wt Readings from Last 3 Encounters:   10/09/21 170 lb (77.1 kg)   10/08/21 170 lb (77.1 kg)     Body mass index is 27.44 kg/m². CBC:   Lab Results   Component Value Date    WBC 12.2 10/08/2021    RBC 5.14 10/08/2021    HGB 15.8 10/08/2021    HCT 46.6 10/08/2021    MCV 90.7 10/08/2021    RDW 13.0 10/08/2021     10/08/2021       CMP:   Lab Results   Component Value Date     10/08/2021    K 4.5 10/08/2021     10/08/2021    CO2 25 10/08/2021    BUN 18 10/08/2021    CREATININE 1.1 10/08/2021    GFRAA >60 10/08/2021    LABGLOM >60 10/08/2021    GLUCOSE 131 10/08/2021    PROT 7.8 10/08/2021    CALCIUM 10.0 10/08/2021    BILITOT 1.1 10/08/2021    ALKPHOS 72 10/08/2021    AST 30 10/08/2021    ALT 28 10/08/2021       POC Tests: No results for input(s): POCGLU, POCNA, POCK, POCCL, POCBUN, POCHEMO, POCHCT in the last 72 hours.     Coags:   Lab Results   Component Value Date    PROTIME 12.5 10/08/2021    INR 1.1 10/08/2021    APTT 26.8 10/08/2021       HCG (If Applicable): No results found for: PREGTESTUR, PREGSERUM, HCG, HCGQUANT     ABGs: No results found for: PHART, PO2ART, FMA5DUT, MIV9NBX, BEART, C6PTXVCY Type & Screen (If Applicable):  No results found for: LABABO, LABRH    Drug/Infectious Status (If Applicable):  No results found for: HIV, HEPCAB    COVID-19 Screening (If Applicable): No results found for: COVID19        Anesthesia Evaluation  Patient summary reviewed and Nursing notes reviewed  Airway: Mallampati: II  TM distance: >3 FB   Neck ROM: full  Mouth opening: > = 3 FB Dental:          Pulmonary:normal exam  breath sounds clear to auscultation                             Cardiovascular:            Rhythm: regular  Rate: normal                    Neuro/Psych:               GI/Hepatic/Renal:            ROS comment: Hip Fx. Endo/Other:                     Abdominal:             Vascular: Other Findings:             Anesthesia Plan      general     ASA 1 - emergent       Induction: intravenous. MIPS: Prophylactic antiemetics administered. Anesthetic plan and risks discussed with patient. Plan discussed with CRNA.                   Laila Noland DO   10/9/2021

## 2021-10-09 NOTE — H&P
Department of Orthopedic Surgery  History and physical          Reason for Consult: Right Hip Pain    HISTORY OF PRESENT ILLNESS:       Patient is a 79 y.o. male who presents with hip pain after a fall down multiple steps earlier today. Patient states he noted immediate hip pain as well as right elbow pain and laceration. He originally presented to [facility] ER where x-rays were obtained showing a impacted femoral neck fracture of his right hip. He also had negative elbow x-rays with a laceration over that region. The laceration was sutured in the ER. Currently states he has no significant past medical history. He denies any chest pains, kidney issues or lung issues. He is a moderately active individual lives at home with his granddaughter. He denies any numbness tingling or paresthesias. He is normally a community ambulator without assistance. He does have a previous surgical history of meniscectomy as well as ankle surgery. Past Medical History:    History reviewed. No pertinent past medical history. Past Surgical History:    History reviewed. No pertinent surgical history. Current Medications:   Current Facility-Administered Medications: oxyCODONE-acetaminophen (PERCOCET) 5-325 MG per tablet 1 tablet, 1 tablet, Oral, Q6H PRN  morphine sulfate (PF) injection 4 mg, 4 mg, IntraVENous, Q3H PRN  ondansetron (ZOFRAN) injection 4 mg, 4 mg, IntraVENous, Q6H PRN  tranexamic acid (CYKLOKAPRON) 1,000 mg in dextrose 5 % 100 mL IVPB, 1,000 mg, IntraVENous, Once  Allergies:  Shellfish-derived products    Social History:   TOBACCO:   has no history on file for tobacco use. ETOH:   reports previous alcohol use. DRUGS:   reports previous drug use. ACTIVITIES OF DAILY LIVING:    OCCUPATION:    Family History:   History reviewed. No pertinent family history.     REVIEW OF SYSTEMS:  CONSTITUTIONAL:  negative for  fevers, chills  EYES:  negative for visual change  HEENT:  negative for acute hearing change  RESPIRATORY:  negative for acute shortness of breath  CARDIOVASCULAR:  negative for acute chest pain  GASTROINTESTINAL:  negative for nausea, vomiting  HEMATOLOGIC/LYMPHATIC: Positive bleeding from laceration  MUSCULOSKELETAL:  positive for right hip, mild right elbow pain. NEUROLOGICAL:  negative for headaches, dizziness  BEHAVIOR/PSYCH:  negative for increased agitation and anxiety    PHYSICAL EXAM:    VITALS:  /87   Pulse 68   Temp 97.3 °F (36.3 °C) (Temporal)   Resp 16   Ht 5' 6\" (1.676 m)   Wt 170 lb (77.1 kg)   SpO2 98%   BMI 27.44 kg/m²   CONSTITUTIONAL:  awake, alert, cooperative, no apparent distress, and appears stated age  Physical Exam  Constitutional:       General: He is not in acute distress. Appearance: Normal appearance. HENT:      Head: Normocephalic. Eyes:      Extraocular Movements: Extraocular movements intact. Pupils: Pupils are equal, round, and reactive to light. Cardiovascular:      Pulses: Normal pulses. Pulmonary:      Effort: Pulmonary effort is normal. No respiratory distress. Breath sounds: Normal breath sounds. Abdominal:      General: Abdomen is flat. Palpations: Abdomen is soft. Skin:     General: Skin is warm and dry. Capillary Refill: Capillary refill takes less than 2 seconds. Neurological:      Mental Status: He is alert and oriented to person, place, and time. Gait: Gait abnormal.   Psychiatric:         Mood and Affect: Mood normal.         Behavior: Behavior normal.         Thought Content: Thought content normal.       MUSCULOSKELETAL:  Right lower Extremity:  Gross examination right lower extremity appears nearly anatomic with mildly shortened.   +Log roll  + Heel Strike  -TTP over Knee and Ankle  Skin intact with no signs of degloving  Compartments soft and compressible, calf non-tender  +DP & PT pulses, Brisk Cap refill, Toes warm and perfused  Sensation grossly intact superficial/deep peroneal,saphenous,sural,tibial n. distributions  · +GS/TA/EHL. Secondary Exam:   rightUE: Dressing over elbow. Full ROM without pain to the elbow. Point tenderness to the laceration site. -TTP to fingers, hand, wrist, forearm, humerus, shoulder or clavicle. · leftLE: No obvious signs of trauma. -TTP to foot, ankle, leg, knee, thigh, hip. DATA:    CBC:   Lab Results   Component Value Date    WBC 12.2 10/08/2021    RBC 5.14 10/08/2021    HGB 15.8 10/08/2021    HCT 46.6 10/08/2021    MCV 90.7 10/08/2021    MCH 30.7 10/08/2021    MCHC 33.9 10/08/2021    RDW 13.0 10/08/2021     10/08/2021    MPV 11.9 10/08/2021     PT/INR:    Lab Results   Component Value Date    PROTIME 12.5 10/08/2021    INR 1.1 10/08/2021     Lactic Acid : No results found for: [address]    Radiology Review:  10/09/21 - XR Right hip  There is an impacted, subcapital femoral neck fracture with mild shortening. No other acute fractures or dislocations are appreciated. XR Right femur  Pending    XR right elbow  Pending    IMPRESSION:   · Closed, Right Femoral neck Fracture  · Right elbow trauma    PLAN:  Plan for surgery with Dr. Eyal Machado on 10/9/21  NPO Now, Medicine on consult. No documented co-morbidities. Orthopedic admission. RLE Non-weight bearing  Pre-op Labs and Imaging  Pain control IV and PO, wean to orals as able. Hold Anticoagulation   Had a long discussion with patient about total versus hemihip arthroplasty. Patient would like to elect and move forward with hemihip arthroplasty at this time. He states he has not had prior hip pain, admits he has only moderately active. Review and discuss with Dr. Eyal Machado      I have seen and evaluated the patient and agree with the above assessment on today's visit. I have performed the key components of the history and physical examination and concur completely with the findings and plans as documented.     Agree with ROS, examination, FMH, PMH, PSH, SocHx, and allergies as above. Patient physically seen and examined. Patient has no significant groin pain prior to fall in his right femoral neck fracture. He does get lateral hip pain bilaterally at times. Talk to in detail about total hip arthroplasty versus hemiarthroplasty. We both agreed on the fact that we will do a hemiarthroplasty unless his acetabulum appears arthritic at the time of surgery. I explained the surgery in detail. I explained the risks and complications of surgery with the patient including but not limited to death from anesthesia, possible neurovascular damage, possible infection,  Possible wound healing issues, possible perioperative fracture, leg length discrepancy, implant failure, possible need for further surgery, etc.  Patient understood this, asked appropriate questions and decided to go forward with the procedure. Physical Examination:   General appearance: alert, well appearing, and in no distress,  normal appearing weight. No visible signs of trauma   Mental status: alert, oriented to person, place, and time, normal mood, behavior, speech, dress, motor activity, and thought processes  Abdomen: soft, nondistended  Resp:   resp easy and unlabored, no audible wheezes note, normal symmetrical expansion of both hemithoraces  Cardiac: distal pulses palpable, skin and extremities well perfused  Neurological: alert, oriented X3, normal speech, no focal findings or movement disorder noted, motor and sensory grossly normal bilaterally, normal muscle tone, no tremors  HEENT: normochephalic atraumatic, external ears and eyes normal, sclera normal, neck supple, no nasal discharge.    Extremities:   peripheral pulses normal, no edema, redness or tenderness in the calves   Skin: normal coloration, no rashes or open wounds, no suspicious skin lesions noted  Psych: Affect euthymic   Musculoskeletal:   Extremity:  Right Hip   Skin intact over hip   Compartments soft and compressible   Leg externally rotated   Calves soft and NT   2/4 DP and PT pulses   Sensation intact   Wiggles toes   Fires L4 L5 and S1   Pain with log roll and heel strike          ELECTRONICALLY signed by:    Sri Perdomo MD  10/9/21   This is been dictated utilizing voice recognition software. All efforts have been made to make the note accurate although inadvertent errors may be present.

## 2021-10-09 NOTE — PLAN OF CARE
Problem: Falls - Risk of:  Goal: Will remain free from falls  10/9/2021 1657 by Yuridia Bolaños RN  Outcome: Met This Shift  10/9/2021 0929 by Yuridia Bolaños RN  Outcome: Met This Shift  Goal: Absence of physical injury  Outcome: Met This Shift

## 2021-10-09 NOTE — OP NOTE
Operative Note      Patient: Allison Bean  YOB: 1950  MRN: [id number]    Date of Procedure: 10/9/2021    Pre-Op Diagnosis: Right displaced femoral neck fracture    Post-Op Diagnosis: Same       Procedure(s):  RIGHT HIP HEMIARTHROPLASTY    Surgeon(s):  Mikaela Cristina MD    Assistant:   Resident: Christophe Pelletier DO; Igor Walker DO    Anesthesia: General    Estimated Blood Loss (mL): less than 50     Complications: None    Specimens:   ID Type Source Tests Collected by Time Destination   A : right femoral head Tissue Hip SURGICAL PATHOLOGY Mikaela Cristina MD 10/9/2021 1359        Implants:  Implant Name Type Inv. Item Serial No.  Lot No. LRB No. Used Action   STEM FEM SZ 5 L108MM NK L35MM 40MM OFFSET 132DEG HIP TI  STEM FEM SZ 5 L108MM NK L35MM 40MM OFFSET 132DEG HIP TI  [employer] [id number] Right 1 Implanted   IMPL HIP FEM HEAD LFIT V40 26MM + 4MM Hip IMPL HIP FEM HEAD LFIT V40 26MM + 4MM  [employer]: [employer]-PMM [id number] Right 1 Implanted   HEAD FEM OD51MM ID26MM HIP CO CHROM POLYETH BPLR CEMENTLESS  HEAD FEM OD51MM ID26MM HIP CO CHROM POLYETH BPLR CEMENTLESS  [employer] [id number] Right 1 Implanted         Drains: * No LDAs found *    Findings: Use a size #5 Accolade  degree angle stem with a +4 neck and a 51 mm bipolar head    Detailed Description of Procedure:     Brief Hospital Course:  Patient was admitted through the ER c/o Right hip pain. Xrays revealed a Right femoral neck fx. After examination of the patient, review of the radiologic studies, and appropriate pre-operative risk assessment, I recommended Right hip arthroplasty, which the patient was agreeable towards. Operative Course: Outside the operating suite, the patient was seen and identified. The operative site was marked and identified as appropriate by the patient, staff, surgeon, and Anesthesia.  The patient was taken into the operating room, placed on the operative table, and placed under the appropriate amount of sedation under the care of the anesthesia team. The patient was placed into a lateral decubitus position. All bony prominences and neurovascular structures were well padded and protected. The operative site was then prepped and draped in a standard sterile fashion. An incision was made over the lateral trochanteric region and carried down to the level of the fascia taina maintaining appropriate hemostasis with electrocautery. A small rent was placed in the fascia taina. Sterling scissors were used to extend the fascia taina incision in line with its fibers to the length of the skin incision. A Charnley retractor was then placed in the anterior and posterior margin of the tensor fascia taina incision, taking care not to violate any neurovascular structures. The anterior one-third of the gluteus medius tendon was identified. In line with its fibers, it was reflected using electrocautery off its trochanteric insertion. The gluteus medius and minimus tendons were reflected anteriorly down to the level of the hip capsule. The hip capsule was T'd up to the acetabulum and around the posterior medial and inferior aspects of the hip. The fracture was visualized. An oscillating saw was then used to make the femoral neck cut at a 45-degree angle, 1 cm proximal to the lesser trochanter. After this was done, a corkscrew was then placed into the femoral head. The femoral head was levered out of the acetabulum, and taken for sizing. At this point, the acetabulum was then inspected, removing all bony fragments and interposed tissue. A Woody retractor was placed under the femoral neck cut. A box osteotome was introduced into the femoral neck which was removed. Then a T-handle canal finder was then introduced and removed. Broaching began at a size 0 and was carried up sequentially to an appropriately sized broach, where we found appropriate stability, axially and rotationally. The broach was then removed. The corresponding sized femoral component was then impacted in the appropriate position, which was found to have appropriate stable fit. An appropriately sized femoral head trial was then placed. The hip was reduced, taken through a range of motion, and was found to be appropriately stable. The hip was then dislocated. The femoral head trial component was then removed. The bipolar component was impacted onto the femoral stem into the appropriate position. The hip was then reduced, taken through a range of motion, and was found to be appropriately stable. Copious irrigation was performed of the joint. Platelet-rich plasma gel was then injected into the joint. Also, #5 Ethibond was used to reapproximate the gluteus medius tendon to its trochanteric insertion. Copious irrigation was performed once more. The tensor fascia taina was then closed with an #0 Vicryl. Copious irrigation was performed once more. #0 and 2-0 Vicryl were used to close the subcutaneous layer. Staples were used for the skin. A sterile layered dressing was placed over the wound. The patient was awakened from anesthesia, transferred to a hospital bed, and taken to the PACU in stable condition. Disposition: The patient was taken to PACU in stable condition. Once stable, he will be transferred to the floor. Orders have been provided to begin physical therapy, weight bear as tolerated Right lower extremity. Patient received a dose of Ancef preoperatively. We will continue this for 24 hours postoperatively for infection prophylaxis. The patient will also be started on  Lovenox while in the hospital and transition to aspirin orally as an outpatient for DVT prophylaxis.     Electronically signed by Damir Murphy MD on 10/9/2021 at 2:26 PM

## 2021-10-09 NOTE — ED PROVIDER NOTES
HPI:  10/9/21, Time: 5:49 AM EDT         Ramez Jimenez is a 79 y.o. male presenting to the ED for fall. Patient mechanical fall at home. Denies hitting his head or loss of conscious, does complain of a sharp right hip pain, worse when he moves, improves with rest.  He is on no anticoagulation. He was evaluated outside facility and found to have a hip fracture. He was transferred here for orthopedic evaluation. Patient denies any head pain, neck pain, nausea, vomit, fever, chills, cough, sputum, lethargy, paresthesias, or any other symptoms or complaints. Review of Systems:   A complete review of systems was performed and pertinent positives and negatives are stated within HPI, all other systems reviewed and are negative.          --------------------------------------------- PAST HISTORY ---------------------------------------------  Past Medical History:  has no past medical history on file. Past Surgical History:  has no past surgical history on file. Social History:  reports previous alcohol use. He reports previous drug use. Family History: family history is not on file. The patients home medications have been reviewed. Allergies: Shellfish-derived products    -------------------------------------------------- RESULTS -------------------------------------------------  All laboratory and radiology results have been personally reviewed by myself   LABS:  No results found for this visit on 10/09/21. RADIOLOGY:  Interpreted by Radiologist.  XR CHEST 1 VIEW    (Results Pending)   XR FEMUR RIGHT (MIN 2 VIEWS)    (Results Pending)       ------------------------- NURSING NOTES AND VITALS REVIEWED ---------------------------   The nursing notes within the ED encounter and vital signs as below have been reviewed.    /87   Pulse 68   Temp 97.3 °F (36.3 °C) (Temporal)   Resp 16   Ht 5' 6\" (1.676 m)   Wt 170 lb (77.1 kg)   SpO2 98%   BMI 27.44 kg/m²   Oxygen Saturation Interpretation: Normal      ---------------------------------------------------PHYSICAL EXAM--------------------------------------      Constitutional/General: Alert and oriented x3, well appearing, non toxic in NAD  Head: Normocephalic and atraumatic  Eyes: PERRL, EOMI  Mouth: Oropharynx clear, handling secretions, no trismus  Neck: Supple, full ROM,   Pulmonary: Lungs clear to auscultation bilaterally, no wheezes, rales, or rhonchi. Not in respiratory distress  Cardiovascular:  Regular rate and rhythm, no murmurs, gallops, or rubs. 2+ distal pulses  Abdomen: Soft, non tender, non distended,   Extremities: Moves all extremities x 3. Right lower extremity: Tenderness with logroll of the hip, dorsalis pedis 2+, no signs of ischemic limb or compartment syndrome  Skin: warm and dry without rash  Neurologic: GCS 15,  Psych: Normal Affect      ------------------------------ ED COURSE/MEDICAL DECISION MAKING----------------------  Medications   oxyCODONE-acetaminophen (PERCOCET) 5-325 MG per tablet 1 tablet (has no administration in time range)   morphine sulfate (PF) injection 4 mg (has no administration in time range)   ondansetron (ZOFRAN) injection 4 mg (has no administration in time range)   tranexamic acid (CYKLOKAPRON) 1,000 mg in dextrose 5 % 100 mL IVPB (has no administration in time range)         ED COURSE:       Medical Decision Making:    Ortho to evaluate, they will admit. Counseling: The emergency provider has spoken with the patient and discussed todays results, in addition to providing specific details for the plan of care and counseling regarding the diagnosis and prognosis. Questions are answered at this time and they are agreeable with the plan.      --------------------------------- IMPRESSION AND DISPOSITION ---------------------------------    IMPRESSION  1.  Closed fracture of right hip, initial encounter [facility]        DISPOSITION  Disposition: Admit to med/surg floor  Patient condition is stable      NOTE: This report was transcribed using voice recognition software.  Every effort was made to ensure accuracy; however, inadvertent computerized transcription errors may be present       Marianna Jones MD  10/09/21 4554

## 2021-10-09 NOTE — PROGRESS NOTES
OT SESSION ATTEMPT     Date:10/9/2021  Patient Name: Rosa Palacios  MRN: [id number]  : 1950  Room: [address] [place], [place] [address]-X     Attempted OT session this date:    [] unavailable due to other medical staff currently with pt   [] on hold, await MRI/ neurosurgical recommendations. [] on hold per nursing staff secondary to lab / radiology results    [] declined Occupational Therapy  this date due to ___. Benefits of participation in therapy reviewed with pt.    [] off unit   [x] Other: HOLD for surgery today of RLE    Will reattempt OT eval at a later time.     [place], [place] [address]

## 2021-10-09 NOTE — PROGRESS NOTES
Physical Therapy    Facility/Department: [facility]    NAME: Kelley Seymour  : 1950  MRN: [id number]      Date: 10/9/2021      Room #:  1143/8059-F      Physical therapy evaluation attempted however could not be completed at this time due to:     [] Pt refusal despite encouragement from PT  [] Pt currently off the unit at testing/procedure   [] Pt with nursing for hygiene care  [] Pt at hemodialysis  [] Pt on hold and awaiting medical clarification   [x] Other:  Pt going to OR today. Comments: Will attempt again when pt is able to participate in evaluation. Thank you kindly for the opportunity to assist in the care of this pt.        Aby Hewitt, PT, DPT   [id number]

## 2021-10-09 NOTE — PROGRESS NOTES
Floor Called, nurse to nurse given. Spoke with Wagner Cortes . Patients test results review, VS reported to receiving nurse. Any and all important information regarding patient disclosed.

## 2021-10-09 NOTE — ED NOTES
Assumed care of pt at this time; resting in bed; c/o pain in right hip; vitals stable; will monitor     Jasen Dowd RN  10/09/21 6177

## 2021-10-10 LAB
ANION GAP SERPL CALCULATED.3IONS-SCNC: 15 MMOL/L (ref 7–16)
BUN BLDV-MCNC: 20 MG/DL (ref 6–23)
CALCIUM SERPL-MCNC: 9.5 MG/DL (ref 8.6–10.2)
CHLORIDE BLD-SCNC: 99 MMOL/L (ref 98–107)
CO2: 23 MMOL/L (ref 22–29)
CREAT SERPL-MCNC: 1.2 MG/DL (ref 0.7–1.2)
GFR AFRICAN AMERICAN: >60
GFR NON-AFRICAN AMERICAN: 60 ML/MIN/1.73
GLUCOSE BLD-MCNC: 125 MG/DL (ref 74–99)
HCT VFR BLD CALC: 42.7 % (ref 37–54)
HEMOGLOBIN: 14.3 G/DL (ref 12.5–16.5)
MCH RBC QN AUTO: 30.8 PG (ref 26–35)
MCHC RBC AUTO-ENTMCNC: 33.5 % (ref 32–34.5)
MCV RBC AUTO: 91.8 FL (ref 80–99.9)
PDW BLD-RTO: 13.1 FL (ref 11.5–15)
PLATELET # BLD: 96 E9/L (ref 130–450)
PLATELET CONFIRMATION: NORMAL
PMV BLD AUTO: 12.5 FL (ref 7–12)
POTASSIUM REFLEX MAGNESIUM: 4.5 MMOL/L (ref 3.5–5)
POTASSIUM SERPL-SCNC: 4.5 MMOL/L (ref 3.5–5)
RBC # BLD: 4.65 E12/L (ref 3.8–5.8)
SODIUM BLD-SCNC: 137 MMOL/L (ref 132–146)
WBC # BLD: 16.7 E9/L (ref 4.5–11.5)

## 2021-10-10 PROCEDURE — 36415 COLL VENOUS BLD VENIPUNCTURE: CPT

## 2021-10-10 PROCEDURE — 97535 SELF CARE MNGMENT TRAINING: CPT

## 2021-10-10 PROCEDURE — 1200000000 HC SEMI PRIVATE

## 2021-10-10 PROCEDURE — 6370000000 HC RX 637 (ALT 250 FOR IP): Performed by: STUDENT IN AN ORGANIZED HEALTH CARE EDUCATION/TRAINING PROGRAM

## 2021-10-10 PROCEDURE — 2500000003 HC RX 250 WO HCPCS: Performed by: STUDENT IN AN ORGANIZED HEALTH CARE EDUCATION/TRAINING PROGRAM

## 2021-10-10 PROCEDURE — 80048 BASIC METABOLIC PNL TOTAL CA: CPT

## 2021-10-10 PROCEDURE — 6370000000 HC RX 637 (ALT 250 FOR IP): Performed by: INTERNAL MEDICINE

## 2021-10-10 PROCEDURE — 97165 OT EVAL LOW COMPLEX 30 MIN: CPT

## 2021-10-10 PROCEDURE — 97161 PT EVAL LOW COMPLEX 20 MIN: CPT

## 2021-10-10 PROCEDURE — 6360000002 HC RX W HCPCS: Performed by: STUDENT IN AN ORGANIZED HEALTH CARE EDUCATION/TRAINING PROGRAM

## 2021-10-10 PROCEDURE — 2580000003 HC RX 258: Performed by: STUDENT IN AN ORGANIZED HEALTH CARE EDUCATION/TRAINING PROGRAM

## 2021-10-10 PROCEDURE — 85027 COMPLETE CBC AUTOMATED: CPT

## 2021-10-10 RX ORDER — TAMSULOSIN HYDROCHLORIDE 0.4 MG/1
0.4 CAPSULE ORAL DAILY
Status: DISCONTINUED | OUTPATIENT
Start: 2021-10-10 | End: 2021-10-11 | Stop reason: HOSPADM

## 2021-10-10 RX ADMIN — ASPIRIN 325 MG: 325 TABLET, COATED ORAL at 09:00

## 2021-10-10 RX ADMIN — SODIUM CHLORIDE, PRESERVATIVE FREE 10 ML: 5 INJECTION INTRAVENOUS at 09:00

## 2021-10-10 RX ADMIN — TAMSULOSIN HYDROCHLORIDE 0.4 MG: 0.4 CAPSULE ORAL at 12:43

## 2021-10-10 RX ADMIN — SODIUM CHLORIDE, PRESERVATIVE FREE 10 ML: 5 INJECTION INTRAVENOUS at 20:22

## 2021-10-10 RX ADMIN — SODIUM CHLORIDE, PRESERVATIVE FREE 10 ML: 5 INJECTION INTRAVENOUS at 06:31

## 2021-10-10 RX ADMIN — OXYCODONE 10 MG: 5 TABLET ORAL at 20:21

## 2021-10-10 RX ADMIN — CEFAZOLIN 2000 MG: 10 INJECTION, POWDER, FOR SOLUTION INTRAVENOUS at 06:31

## 2021-10-10 RX ADMIN — ASPIRIN 325 MG: 325 TABLET, COATED ORAL at 20:21

## 2021-10-10 RX ADMIN — OXYCODONE 10 MG: 5 TABLET ORAL at 13:43

## 2021-10-10 ASSESSMENT — PAIN DESCRIPTION - DESCRIPTORS: DESCRIPTORS: ACHING;DISCOMFORT;SORE

## 2021-10-10 ASSESSMENT — PAIN DESCRIPTION - PROGRESSION: CLINICAL_PROGRESSION: NOT CHANGED

## 2021-10-10 ASSESSMENT — PAIN DESCRIPTION - PAIN TYPE
TYPE: ACUTE PAIN
TYPE: SURGICAL PAIN

## 2021-10-10 ASSESSMENT — PAIN DESCRIPTION - LOCATION
LOCATION: ELBOW;HIP
LOCATION: HIP

## 2021-10-10 ASSESSMENT — PAIN DESCRIPTION - ORIENTATION
ORIENTATION: RIGHT
ORIENTATION: RIGHT

## 2021-10-10 ASSESSMENT — PAIN SCALES - GENERAL
PAINLEVEL_OUTOF10: 8
PAINLEVEL_OUTOF10: 8
PAINLEVEL_OUTOF10: 4

## 2021-10-10 ASSESSMENT — PAIN - FUNCTIONAL ASSESSMENT: PAIN_FUNCTIONAL_ASSESSMENT: PREVENTS OR INTERFERES SOME ACTIVE ACTIVITIES AND ADLS

## 2021-10-10 ASSESSMENT — PAIN DESCRIPTION - FREQUENCY
FREQUENCY: CONTINUOUS
FREQUENCY: INTERMITTENT

## 2021-10-10 ASSESSMENT — PAIN DESCRIPTION - ONSET: ONSET: GRADUAL

## 2021-10-10 NOTE — FLOWSHEET NOTE
Pt refusing bladder scan, he does not want holliday inserted.  Malclom Punches he is going a little bit, feels a little better this am.

## 2021-10-10 NOTE — PROGRESS NOTES
Physical Therapy    Facility/Department: [facility]  Initial Assessment    NAME: Juan Antonio Guillory  : 1950  MRN: [id number]    Date of Service: 10/10/2021    Referring Provider:  Jason Soto DO    Evaluating PT:  Maxime Leung, PT, DPT, [id number]    Room #:  5732/6993-A  Diagnosis:  R hip fracture  Procedure/Surgery:  10/9/2021 R hip hemiarthroplasty  Precautions:  Falls, R LE WBAT, R hip anterolateral precautions  Equipment Needs:  [employer]    SUBJECTIVE:    Pt lives with his granddaughter (21years old) in a 2 story home with 3 stairs and 1 rail to enter. Bed is on 1st floor and bath is on 1st floor. Pt ambulated with no AD PTA. OBJECTIVE:   Initial Evaluation  Date: 10/10/2021 Treatment Short Term/ Long Term   Goals   AM-PAC 6 Clicks 62/42     Was pt agreeable to Eval/treatment? Yes      Does pt have pain? 0/10 at rest; per pt, pain increases with activity     Bed Mobility  Rolling: NT  Supine to sit: NT  Sit to supine: NT  Scooting: NT  Pt seated in bedside chair before and after evaluation. Rolling: modified independent   Supine to sit: modified independent   Sit to supine: modified independent   Scooting: modified independent    Transfers Sit to stand: SBA  Stand to sit: SBA  Stand pivot: SBA  Sit to stand: modified independent   Stand to sit: modified independent   Stand pivot: modified independent    Ambulation    20'x2 with [employer] SBA  >150' with [employer] modified independent    Stair negotiation: ascended and descended  TBA  4 steps with 1 HR modified independent    ROM BUE:  Defer to OT. BLE:  L LE and R knee/ankle AROM WFL; R hip AROM WFL within hip precautions     Strength BUE:  Defer to OT.   BLE:  L LE 4/5; R hip 3/5 within available ROM, knee and ankle 4/5     Balance Sitting EOB:  NT  Dynamic Standing:  SBA  Sitting EOB:  independent  Dynamic Standing:  modified independent      Pt is A & O x 3  Sensation:  denies numbness/tingling to all extremities  Edema:  none noted    Vitals:  Not assessed during evaluation. Blood Pressure at rest  Blood Pressure post session    Heart Rate at rest  Heart Rate post session    SPO2 at rest  SPO2 post session      Therapeutic Exercises:  NA    Patient education  Pt educated on safety with functional mobility. Patient response to education:   Pt verbalized understanding Pt demonstrated skill Pt requires further education in this area   x  x     ASSESSMENT:    Comments:  Pt seated in bedside chair upon PT's arrival.  Pt stated he has been having difficulty urinating since having surgery. Pt impulsive and demonstrated decreased safety awareness during evaluation. With initial sit>stand, pt lost his balance and sat back down into chair quickly. Pt given verbal cues for hand placement and safety with transfers. Once standing, pt noted to be keeping his R LE NWB. Pt educated on WBAT on R LE however, pt maintained R LE NWB during ambulation despite PT's verbal cues. Pt assisted onto/off commode SBA with verbal cues for safety. Pt assisted back to bedside chair with call light in reach. Pt's nurse notified pt was in chair and also of pt's decreased safety awareness. Treatment:  Patient practiced and was instructed in the following treatment:     Transfers - verbal cues for safety and hand placement   Gait - verbal cues for sequencing with [employer] and R LE WBAT    Pt's/ family goals   1. To be able to urinate and go home. Patient and or family understand(s) diagnosis, prognosis, and plan of care. Yes     PLAN:    PLAN OF CARE:    Current Treatment Recommendations     [x] Strengthening     [x] ROM   [] Balance Training   [] Endurance Training   [x] Transfer Training   [x] Gait Training   [x] Stair Training   [x] Positioning   [x] Safety and Education Training   [x] Patient/Caregiver Education   [x] HEP  [] Other     PT care will be provided in accordance with the objectives noted above.  Exercises and functional mobility practice will be used as well as appropriate assistive devices or modalities to obtain goals. Patient and family education will also be administered as needed. Frequency of treatments: 2-5x/week x 1-2 weeks. Time in:  0840  Time out:  0847    Total Treatment Time:  0 minutes     Evaluation Time includes thorough review of current medical information, gathering information on past medical history/social history and prior level of function, completion of standardized testing/informal observation of tasks, assessment of data and education on plan of care and goals.     CPT codes:  [x] Low Complexity PT evaluation 64689  [] Moderate Complexity PT evaluation 99180  [] High Complexity PT evaluation 87642  [] PT Re-evaluation 83784  [] Gait training 88865 - 0 minutes  [] Manual therapy 30303 - 0 minutes  [] Therapeutic activities 89579 - 0 minutes  [] Therapeutic exercises 83138 - 0 minutes  [] Neuromuscular reeducation 00076 - 0 minutes     Winter Fairchild, PT, DPT   [id number]

## 2021-10-10 NOTE — ANESTHESIA POSTPROCEDURE EVALUATION
Department of Anesthesiology  Postprocedure Note    Patient: Eugene Barroso  MRN: [id number]  YOB: 1950  Date of evaluation: 10/9/2021  Time:  8:40 PM     Procedure Summary     Date: 10/09/21 Room / Location: [facility] 09 / [facility]    Anesthesia Start: 4334 Anesthesia Stop: 0474    Procedure: RIGHT HIP HEMIARTHROPLASTY (Right Hip) Diagnosis: (RIGHT FRACTURED HIP)    Surgeons: Emilee Vance MD Responsible Provider: Zeny Colbert DO    Anesthesia Type: general ASA Status: 1 - Emergent          Anesthesia Type: general    Soila Phase I: Soila Score: 9    Soila Phase II:      Last vitals: Reviewed and per EMR flowsheets.        Anesthesia Post Evaluation    Patient location during evaluation: PACU  Patient participation: complete - patient participated  Level of consciousness: awake and alert  Airway patency: patent  Nausea & Vomiting: no nausea and no vomiting  Complications: no  Cardiovascular status: blood pressure returned to baseline  Respiratory status: acceptable  Hydration status: euvolemic

## 2021-10-10 NOTE — FLOWSHEET NOTE
Pt still having trouble voiding, but does not want straight cath again said to give him a little longer

## 2021-10-10 NOTE — PROGRESS NOTES
Hospitalist Progress Note      SYNOPSIS: Patient admitted on 10/9/2021 for      Mr. Humberto Pimentel is a 79y.o. year old male  Mary Brooke presented to the [facility] ER on 10/8/21 after a fall at home. He reported that he fell down 4 steps and has since been having right sided elbow and hip pain. A 1 cm laceration to the right elbow was repaired in ED. Xray revealed a right femoral neck fracture. He was transferred to [facility] for orthopedic surgery.     He is for surgery today 10/9/21. We were consulted for medical management. He has no past medical history and takes no medications. He told nursing that he has not seen a doctor in years but tells me that he follows with a physician at the [facility]. SUBJECTIVE:    Patient seen and examined  Records reviewed. The patient states he has difficulty urinating. He states he couldn't urinate. He states he has history of urinary hesitancy. He has never seen by a urologist before. Stable overnight. No other overnight issues reported. Temp (24hrs), Av.7 °F (36.5 °C), Min:97.1 °F (36.2 °C), Max:98.4 °F (36.9 °C)    DIET: ADULT DIET; Regular  CODE: Full Code    Intake/Output Summary (Last 24 hours) at 10/10/2021 1244  Last data filed at 10/9/2021 2345  Gross per 24 hour   Intake 1100 ml   Output 650 ml   Net 450 ml       OBJECTIVE:    BP (!) 186/98   Pulse 109   Temp 98.4 °F (36.9 °C) (Temporal)   Resp 18   Ht 5' 6\" (1.676 m)   Wt 170 lb (77.1 kg)   SpO2 95%   BMI 27.44 kg/m²     General appearance: No apparent distress, appears stated age and cooperative. HEENT:  Conjunctivae/corneas clear. Neck: Supple. No jugular venous distention. Respiratory: Clear to auscultation bilaterally, normal respiratory effort  Cardiovascular: Regular rate rhythm, normal S1-S2  Abdomen: Soft, nontender, nondistended  Musculoskeletal: No clubbing, cyanosis, no bilateral lower extremity edema. Brisk capillary refill.    Skin:  No rashes  on visible skin  Neurologic: awake, alert and following commands     ASSESSMENT:    Right femoral neck fracture  HTN  Urinary retention. Hx. Of BPH? Guillermo [place] Pansystolic murmur    PLAN:    S/p right hip hemiarthroplasty on 10/9   Consult Urology  ECHO pending. Started on Flomax. Monitor BP may need antihypertensives    DISPOSITION:     Medications:  REVIEWED DAILY    Infusion Medications    sodium chloride       Scheduled Medications    tamsulosin  0.4 mg Oral Daily    sodium chloride flush  5-40 mL IntraVENous 2 times per day    aspirin  325 mg Oral BID     PRN Meds: ondansetron, polyethylene glycol, sodium chloride flush, sodium chloride, oxyCODONE **OR** oxyCODONE, morphine **OR** morphine    Labs:     Recent Labs     10/08/21  1926 10/10/21  0916   WBC 12.2* 16.7*   HGB 15.8 14.3   HCT 46.6 42.7   * 96*       Recent Labs     10/08/21  1926 10/10/21  0916    137   K 4.5 4.5  4.5    99   CO2 25 23   BUN 18 20   CREATININE 1.1 1.2   CALCIUM 10.0 9.5       Recent Labs     10/08/21  1926   PROT 7.8   ALKPHOS 72   ALT 28   AST 30   BILITOT 1.1       Recent Labs     10/08/21  1926   INR 1.1       No results for input(s): Carlie Stallings in the last 72 hours. Chronic labs:    Lab Results   Component Value Date    INR 1.1 10/08/2021       Radiology: REVIEWED DAILY    +++++++++++++++++++++++++++++++++++++++++++++++++  Anselmo Mariscal MD  [facility] Physician - [address] [place] [address]  +++++++++++++++++++++++++++++++++++++++++++++++++  NOTE: This report was transcribed using voice recognition software. Every effort was made to ensure accuracy; however, inadvertent computerized transcription errors may be present.

## 2021-10-10 NOTE — CARE COORDINATION
Social Work Discharge Planning:  SW made referral to [facility] with [employer] and to [place] with [facility] for a walker.  MISTY following  Electronically signed by YULIA Sheridan on 10/10/2021 at 10:51 AM

## 2021-10-10 NOTE — PROGRESS NOTES
[address]    Date:10/10/2021                                                  Patient Name: Allison Bean    MRN: [id number]    : 1950    Room: [address] [place] [address]      Evaluating OT: ANGELY Underwood/L, [id number]    Referring Provider: Igor Walker DO  Specific Provider Orders/Date: OT eval and treat 10/10/21    Diagnosis: Closed fracture of right hip, initial encounter [facility]) [S72.001A]  Hip fracture requiring operative repair, right, closed, initial encounter [facility]) [S72.001A]   Surgery: R hip hemiarthroplasty 10/9/21     Pertinent Medical History:    History reviewed. No pertinent past medical history. History reviewed. No pertinent surgical history.     Precautions:  Fall Risk, anterolateral hip precautions, WBAT RLE    Assessment of current deficits    [x] Functional mobility  [x]ADLs  [] Strength               []Cognition    [x] Functional transfers   [x] IADLs         [x] Safety Awareness   [x]Endurance    [x] Fine Coordination              [x] Balance      [] Vision/perception   []Sensation     []Gross Motor Coordination  [] ROM  [] Delirium                   [] Motor Control     OT PLAN OF CARE   OT POC based on physician orders, patient diagnosis and results of clinical assessment    Frequency/Duration 1-3 days/wk for 2 weeks PRN   Specific OT Treatment Interventions to include:   * Instruction/training on adapted ADL techniques and AE recommendations to increase functional independence within precautions       * Training on energy conservation strategies, correct breathing pattern and techniques to improve independence/tolerance for self-care routine  * Functional transfer/mobility training/DME recommendations for increased independence, safety, and fall prevention  * Patient/Family education to increase follow through with safety techniques and functional independence  * Recommendation of environmental modifications for increased safety with functional transfers/mobility and ADLs  * Splinting/positioning for increased function, prevention of contractures, and improve skin integrity  * Therapeutic exercise to improve motor endurance, ROM, and functional strength for ADLs/functional transfers  * Therapeutic activities to facilitate/challenge dynamic balance, stand tolerance for increased safety and independence with ADLs  * Therapeutic activities to facilitate gross/fine motor skills for increased independence with ADLs  * Positioning to improve skin integrity, interaction with environment and functional independence    Recommended Adaptive Equipment:  Elevated toilet seat, shower chair, ww, lower body dressing/bathing equipment (**issued 10/10)    Comments: Based on patient's functional performance as stated below and level of assistance needed prior to admission, this therapist believes that the patient would benefit from further skilled OT following hospital stay in an effort to increase safety, functional independence, and quality of life. Home Living: Pt lives with granddtr in a 2 story home with 3 step(s) to enter and 1 rail(s); bed/bath on main floor. Bathroom setup: tub shower, no safety bars; standard toilet  Equipment owned: none    Prior Level of Function: independent with ADLs and with IADLs; using no device for ambulation. Driving: yes  Occupation: retired sales    Pain Level: reports 6-7/10 R hip pain, addressed with repositioning;   Cognition: A&O: 4/4; Follows 2-3 step directions. Impulsive. Poor safety.    Memory: G   Sequencing: G   Problem solving: F   Judgement/safety: F-     Functional Assessment: AM-PAC Daily Activity Raw Score: 17/24   Initial Eval Status  Date: 10/10/21 Treatment Status  Date: STGs = LTGs  Time frame: 10-14 days   Feeding Set up A        Grooming SBA  To wash hands in standing at sink    Independent   UB Dressing Set up A LB Dressing Min A  Demo'd use of LB AE with G understanding    Mod I   Bathing Min A  Simulated tub transfer and use of AE    Mod I   Toileting SBA  Cues for hand placement and body alignment    Mod I   Bed Mobility  Rolling: NT  Supine to sit: NT  Sit to supine: NT     Functional Transfers Sit to stand: SBA  Stand to sit: SBA  Cues for body alignment and hand placement for safety  Mod I   Functional Mobility SBA ww  For in-room distance x2, cues for ww mgmt within confined bathroom space, poor adherence  Mod I   Balance Sitting:     Static:  Independent     Dynamic: supervision  Standing: SBA ww     Endurance/Activity Tolerance fair  good   Visual/  Perceptual Glasses: yes, worn in room              Hand Dominance R   AROM (PROM) Strength Additional Info:    RUE  WFL 5/5 good  and wfl FMC/dexterity noted during ADL tasks       LUE WFL 5/5 good  and wfl FMC/dexterity noted during ADL tasks       Hearing: WFL  Sensation:  No c/o numbness or tingling  Tone: WNL  Edema: unremarkable                            Comments: Upon arrival patient walking in room with ww. Education and demonstration regarding hip precautions with fair retention, reinforcement needed. Patient preoccupied with need to urinate, has been unable to fully void bladder since surgery- impulsively going to bathroom before, during, and after session. B hands tremulous. Patient's safety awareness impaired, requiring cues for safety and maintenance of hip precautions at rest and during functional activities. Patient does not seem receptive to bathroom adaptive equipment for safety and adherence to hip precautions, further discussion needed. Extensive education regarding home set up and possible modifications. LB AE demonstration completed with G understanding of use of equipment. After session, patient seated in bathroom with all devices within reach, all lines and tubes intact.       Pt required cues and education as noted above for safe facilitation and completion of tasks. Therapist provided skilled monitoring of patient's response during treatment session. Prior to and at the end of session, environmental modifications/line management completed for patients safety and efficiency of treatment session. Overall, patient demonstrates mild difficulties with completion of BADLs and IADLs. Factors contributing to these difficulties include impaired safety, decreased endurance, and generalized weakness. As noted above, patient likely to benefit from further OT intervention to increase independence, safety, and overall quality of life. Eval Complexity:   · Low Complexity    Treatment:   · Functional transfers: Facilitated transfers from various surfaces with cues for body alignment, safety and hand placement. · ADL completion: Self-care retraining for the above-mentioned ADLs; training on proper hand placement, safety technique, sequencing, and energy conservation techniques. Rehab Potential:  Good for established goals     Patient / Family Goal: Go home      Patient and/or family were instructed on functional diagnosis, prognosis/goals and OT plan of care. Demonstrated good understanding. Eval Complexity: Low      Time In: 0740  Time Out: 0812  Total Time: 32 minutes    Min Units   OT Eval Low 97165  X 1    OT Eval Medium 84973      OT Eval High 77764       OT Re-Eval [id number]       Therapeutic Ex 47437       Therapeutic Activities 05128       ADL/Self Care 92456  17 1    Orthotic Management 61606       Neuro Re-Ed 15762       Non-Billable Time          Evaluation Time additionally includes thorough review of current medical information, gathering information on past medical history/social history and prior level of function, completion of standardized testing/informal observation of tasks, assessment of data and education on plan of care and goals.     Bharath Richardson OTR/L  [id number]

## 2021-10-10 NOTE — PROGRESS NOTES
Patient reporting difficulty urinating, he states that he feels the urge to urinate, but he is unable to do so. Patient bladder scanned for 508 ml. Order noted to straight cath patient PRN, patient straight catheterized at this time, 500 ml carmen colored urine obtained.

## 2021-10-10 NOTE — PLAN OF CARE
Problem: Falls - Risk of:  Goal: Will remain free from falls  10/10/2021 1515 by Aylin Castillo RN  Outcome: Met This Shift  10/10/2021 1515 by Aylin Castillo RN  Outcome: Met This Shift  10/10/2021 0942 by Aylin Castillo RN  Outcome: Met This Shift  Goal: Absence of physical injury  10/10/2021 1515 by Aylin Castillo RN  Outcome: Met This Shift  10/10/2021 1515 by Aylin Castillo RN  Outcome: Met This Shift  10/10/2021 0942 by Aylin Castillo RN  Outcome: Met This Shift

## 2021-10-10 NOTE — PROGRESS NOTES
Department of Orthopedic Surgery  Resident Progress Note    Patient seen and examined. Pain has been at appropriate level given his recent surgery. No chest pain or shortness of breath. No fevers or chills. No numbness or tingling. He has difficulty with urination and required straight catheterization today. VITALS:  BP (!) 186/98   Pulse 109   Temp 98.4 °F (36.9 °C) (Temporal)   Resp 18   Ht 5' 6\" (1.676 m)   Wt 170 lb (77.1 kg)   SpO2 95%   BMI 27.44 kg/m²     General: alert and oriented to person, place and time    MUSCULOSKELETAL:   right lower extremity:  · Dressing C/D/I  · Compartments soft and compressible  · +PF/DF/EHL  · +2/4 DP & PT pulses, Brisk Cap refill, Toes warm and perfused  · Distal sensation grossly intact to Peroneals, Sural, Saphenous, and tibial nrs    CBC:   Lab Results   Component Value Date    WBC 16.7 10/10/2021    HGB 14.3 10/10/2021    HCT 42.7 10/10/2021    PLT 96 10/10/2021     PT/INR:    Lab Results   Component Value Date    PROTIME 12.5 10/08/2021    INR 1.1 10/08/2021           ASSESSMENT  · S/P right hip hemiarthroplasty on 10/9/2021    PLAN      · Continue physical therapy and protocol: WBAT - RLE  · 24 hour abx coverage, completed today  · Deep venous thrombosis prophylaxis -aspirin 325 twice daily, early mobilization  · PT/OT  · Pain Control: IV and PO  · Monitor H&H  · D/C Plan: Patient may be discharged once his urinary retention resolves. He is progressing well from his surgery.

## 2021-10-11 VITALS
TEMPERATURE: 97.5 F | RESPIRATION RATE: 18 BRPM | OXYGEN SATURATION: 100 % | DIASTOLIC BLOOD PRESSURE: 71 MMHG | HEIGHT: 66 IN | HEART RATE: 88 BPM | BODY MASS INDEX: 27.32 KG/M2 | SYSTOLIC BLOOD PRESSURE: 125 MMHG | WEIGHT: 170 LBS

## 2021-10-11 PROCEDURE — 6370000000 HC RX 637 (ALT 250 FOR IP): Performed by: STUDENT IN AN ORGANIZED HEALTH CARE EDUCATION/TRAINING PROGRAM

## 2021-10-11 PROCEDURE — 6370000000 HC RX 637 (ALT 250 FOR IP): Performed by: INTERNAL MEDICINE

## 2021-10-11 PROCEDURE — 97530 THERAPEUTIC ACTIVITIES: CPT

## 2021-10-11 PROCEDURE — 2580000003 HC RX 258: Performed by: STUDENT IN AN ORGANIZED HEALTH CARE EDUCATION/TRAINING PROGRAM

## 2021-10-11 RX ORDER — TAMSULOSIN HYDROCHLORIDE 0.4 MG/1
0.4 CAPSULE ORAL DAILY
Qty: 30 CAPSULE | Refills: 3 | Status: SHIPPED | OUTPATIENT
Start: 2021-10-12 | End: 2021-12-04

## 2021-10-11 RX ADMIN — ASPIRIN 325 MG: 325 TABLET, COATED ORAL at 08:57

## 2021-10-11 RX ADMIN — OXYCODONE 10 MG: 5 TABLET ORAL at 02:49

## 2021-10-11 RX ADMIN — TAMSULOSIN HYDROCHLORIDE 0.4 MG: 0.4 CAPSULE ORAL at 08:57

## 2021-10-11 RX ADMIN — SODIUM CHLORIDE, PRESERVATIVE FREE 10 ML: 5 INJECTION INTRAVENOUS at 08:57

## 2021-10-11 ASSESSMENT — PAIN DESCRIPTION - PAIN TYPE: TYPE: SURGICAL PAIN

## 2021-10-11 ASSESSMENT — PAIN DESCRIPTION - ORIENTATION: ORIENTATION: RIGHT

## 2021-10-11 ASSESSMENT — PAIN DESCRIPTION - LOCATION: LOCATION: HIP

## 2021-10-11 ASSESSMENT — PAIN SCALES - GENERAL
PAINLEVEL_OUTOF10: 7
PAINLEVEL_OUTOF10: 4

## 2021-10-11 ASSESSMENT — PAIN DESCRIPTION - ONSET: ONSET: AWAKENED FROM SLEEP

## 2021-10-11 ASSESSMENT — PAIN DESCRIPTION - DESCRIPTORS: DESCRIPTORS: ACHING;DISCOMFORT;SORE

## 2021-10-11 ASSESSMENT — PAIN DESCRIPTION - FREQUENCY: FREQUENCY: INTERMITTENT

## 2021-10-11 NOTE — CONSULTS
INPATIENT CONSULTATION RECORD FOR  10/11/2021      [facility]  [address]. [facility], [address] [facility]  [phone]        REASON FOR CONSULTATION:      BPH, urinary retention    HISTORY OF PRESENT ILLNESS:      The patient is a 79 y.o. male patient who presents with fall and underwent right hip hemiarthroplasty. Postoperatively he was having difficulty with urination. He admits to many years of weak stream, hesitancy, incomplete emptying. He has not been treated for BPH in the past.  He has not seen a urologist.      History reviewed. No pertinent past medical history. Past Surgical History:   Procedure Laterality Date    HIP SURGERY Right 10/9/2021    RIGHT HIP HEMIARTHROPLASTY performed by Shawn Xiao MD at [facility] OR       Medications Prior to Admission:    No medications prior to admission. Allergies:    Shellfish-derived products    Social History:    reports that he has never smoked. His smokeless tobacco use includes chew. He reports current alcohol use. He reports previous drug use. Family History:   Non-contributory to this Urological problem  family history is not on file. REVIEW OF SYSTEMS:  Respiratory: negative for cough and hemoptysis  Cardiovascular: negative for chest pain and dyspnea  Gastrointestinal: negative for abdominal pain, diarrhea, nausea and vomiting  Derm: negative for rash and skin lesion(s)  Neurological: negative for seizures and tremors  Endocrine: negative for diabetic symptoms including polydipsia and polyuria  : As above in the HPI, otherwise negative  All other systems negative  Right hip pain    PHYSICAL EXAM:    Vitals:  /71   Pulse 88   Temp 97.5 °F (36.4 °C) (Temporal)   Resp 18   Ht 5' 6\" (1.676 m)   Wt 170 lb (77.1 kg)   SpO2 100%   BMI 27.44 kg/m²     General:  Awake, alert, oriented X 3. Well developed, well nourished, well groomed. No apparent distress. HEENT:  Normocephalic, atraumatic.   Pupils equal, round. No scleral icterus. No conjunctival injection. Normal lips, teeth, and gums. No nasal discharge. Neck:  Supple, no masses. Heart:  RRR  Lungs:  No audible wheezing. Respirations symmetric and non-labored. Abdomen:  soft, nontender, no masses, no organomegaly, no peritoneal signs  Extremities:  No clubbing, cyanosis, or edema, right hip pain and abnormal gait. Walking with walker. Skin:  Warm and dry, no open lesions or rashes  Neuro:  Cranial nerves 2-12 intact, no focal deficits  Rectal: deferred  Genitalia: No Ojeda    LABS:    Lab Results   Component Value Date    WBC 16.7 (H) 10/10/2021    HGB 14.3 10/10/2021    HCT 42.7 10/10/2021    MCV 91.8 10/10/2021    PLT 96 (L) 10/10/2021       Lab Results   Component Value Date    CREATININE 1.2 10/10/2021       No results found for: PSA    No results found for: LABURIN    No results found for: BC    No results found for: William Kumar    ASSESSMENT / PLAN:      1. Benign prostatic hyperplasia postop retention. Voiding well now. Patient is taking Flomax. I recommend that he continue on this medication that he follow-up in the office in approximately 6 months to reevaluate or sooner if problems.       Bakari Mtz MD, M.D.  12:35 PM  10/11/2021

## 2021-10-11 NOTE — PROGRESS NOTES
Spoke to Moisés Martínez and patient is okay to discharge without echo and can do it outpatient. Patient is aware as the doctor talked to him about it.

## 2021-10-11 NOTE — PROGRESS NOTES
Discharge papers went over with patient all questions answered. Patient is waiting for scripts to come from outpatient pharmacy. The patients ride will transport him home.

## 2021-10-11 NOTE — PROGRESS NOTES
CLINICAL PHARMACY NOTE: MEDS TO BEDS    Total # of Prescriptions Filled: 2   The following medications were delivered to the patient:  · flomax 0.4mg  · Percocet 5-325mg    Additional Documentation:    Delivered to patient 10/11 @12:10pm

## 2021-10-11 NOTE — PROGRESS NOTES
Hospitalist Progress Note      SYNOPSIS: Patient admitted on 10/9/2021 for      Mr. Roxanne Barajas is a 79y.o. year old male  Rubina Alston presented to the [facility] ER on 10/8/21 after a fall at home. He reported that he fell down 4 steps and has since been having right sided elbow and hip pain. A 1 cm laceration to the right elbow was repaired in ED. Xray revealed a right femoral neck fracture. He was transferred to [facility] for orthopedic surgery.     He is for surgery today 10/9/21. We were consulted for medical management. He has no past medical history and takes no medications. He told nursing that he has not seen a doctor in years but tells me that he follows with a physician at the [facility]. SUBJECTIVE:    Patient seen and examined  Records reviewed. The patient states he has been able to urinate. He states he has chronic problem and does not want to see urology as he can deal with it. He does not want to wait for an ECHO. Risks explained and he understood and states he will talk to [facility] P[facility] at [facility] for an ECHO. Stable overnight. No other overnight issues reported. Temp (24hrs), Av.8 °F (36.6 °C), Min:97.1 °F (36.2 °C), Max:98.9 °F (37.2 °C)    DIET: ADULT DIET; Regular  CODE: Full Code    Intake/Output Summary (Last 24 hours) at 10/11/2021 1057  Last data filed at 10/10/2021 2024  Gross per 24 hour   Intake --   Output 300 ml   Net -300 ml       OBJECTIVE:    /71   Pulse 88   Temp 97.5 °F (36.4 °C) (Temporal)   Resp 18   Ht 5' 6\" (1.676 m)   Wt 170 lb (77.1 kg)   SpO2 100%   BMI 27.44 kg/m²     General appearance: No apparent distress, appears stated age and cooperative. HEENT:  Conjunctivae/corneas clear. Neck: Supple. No jugular venous distention.    Respiratory: Clear to auscultation bilaterally, normal respiratory effort  Cardiovascular: Regular rate rhythm, Systolic Murmur  Abdomen: Soft, nontender, nondistended  Musculoskeletal: No clubbing, cyanosis, no bilateral lower extremity edema. Brisk capillary refill. Skin:  No rashes  on visible skin  Neurologic: awake, alert and following commands     ASSESSMENT:    Right femoral neck fracture  HTN  Urinary retention. Hx. Of BPH? Salvador Hugo Systolic murmur    PLAN:    S/p right hip hemiarthroplasty on 10/9   Pt does not want to wait for ECHO. Risks explained and he understood and pt states that he will get echo done through [facility] out[facility]. Started on Flomax. Thanks for allowing us to participate in the care of [facility]. Pt is being discharged today by the attending. DISPOSITION:     Medications:  REVIEWED DAILY    Infusion Medications    sodium chloride       Scheduled Medications    tamsulosin  0.4 mg Oral Daily    sodium chloride flush  5-40 mL IntraVENous 2 times per day    aspirin  325 mg Oral BID     PRN Meds: ondansetron, polyethylene glycol, sodium chloride flush, sodium chloride, oxyCODONE **OR** oxyCODONE, morphine **OR** morphine    Labs:     Recent Labs     10/08/21  1926 10/10/21  0916   WBC 12.2* 16.7*   HGB 15.8 14.3   HCT 46.6 42.7   * 96*       Recent Labs     10/08/21  1926 10/10/21  0916    137   K 4.5 4.5  4.5    99   CO2 25 23   BUN 18 20   CREATININE 1.1 1.2   CALCIUM 10.0 9.5       Recent Labs     10/08/21  1926   PROT 7.8   ALKPHOS 72   ALT 28   AST 30   BILITOT 1.1       Recent Labs     10/08/21  1926   INR 1.1       No results for input(s): Tesfaye Sheehan in the last 72 hours. Chronic labs:    Lab Results   Component Value Date    INR 1.1 10/08/2021       Radiology: REVIEWED DAILY    +++++++++++++++++++++++++++++++++++++++++++++++++  Bladimir Thomson MD  [facility] Physician - [facility] [place], [facility]  +++++++++++++++++++++++++++++++++++++++++++++++++  NOTE: This report was transcribed using voice recognition software. Every effort was made to ensure accuracy; however, inadvertent computerized transcription errors may be present.

## 2021-10-11 NOTE — PROGRESS NOTES
Physical Therapy    Facility/Department: [facility]  Treatment Note   NAME: Lorelei Gunter  : 1950  MRN: [id number]    Date of Service: 10/11/2021    Referring Provider:  Easton Gallegos DO    Evaluating PT:  Olivia Faria, PT, DPT, [id number]    Supervising PT: Bella Cornejo PT    Room #:  9394/6234-K  Diagnosis:  R hip fracture  Procedure/Surgery:  10/9/2021 R hip hemiarthroplasty  Precautions:  Falls, R LE WBAT, R hip anterolateral precautions  Equipment Needs:  [employer]    SUBJECTIVE:    Pt lives with his granddaughter (21years old) in a 2 story home with 3 stairs and 1 rail to enter. Bed is on 1st floor and bath is on 1st floor. Pt ambulated with no AD PTA. OBJECTIVE:   Initial Evaluation  Date: 10/10/2021 Treatment  10/11/2021 Short Term/ Long Term   Goals   AM-PAC 6 Clicks 67/49     Was pt agreeable to Eval/treatment? Yes  Yes     Does pt have pain? 0/10 at rest; per pt, pain increases with activity Minimal R hip pain     Bed Mobility  Rolling: NT  Supine to sit: NT  Sit to supine: NT  Scooting: NT  Pt seated in bedside chair before and after evaluation. NT Rolling: modified independent   Supine to sit: modified independent   Sit to supine: modified independent   Scooting: modified independent    Transfers Sit to stand: SBA  Stand to sit: SBA  Stand pivot: SBA Sit to stand supervision   Stand to sit supervision   Stand pivot with ww with supervision  Sit to stand: modified independent   Stand to sit: modified independent   Stand pivot: modified independent    Ambulation    20'x2 with [employer]  feet x2 with supervision  >80' with [employer] modified independent    Stair negotiation: ascended and descended  TBA 4 steps with unilateral rail with min/cga  4 steps with 1 HR modified independent    ROM BUE:  Defer to OT. BLE:  L LE and R knee/ankle AROM WFL; R hip AROM WFL within hip precautions     Strength BUE:  Defer to OT.   BLE:  L LE 4/5; R hip 3/5 within available ROM, knee and ankle 4/5     Balance Sitting EOB:  NT  Dynamic Standing:  SBA  Sitting EOB:  independent  Dynamic Standing:  modified independent        Patient education  Pt educated on anterolateral hip precautions     Patient response to education:   Pt verbalized understanding Pt demonstrated skill Pt requires further education in this area   x X with occasional verbal cues  x     ASSESSMENT:    Comments: Nursing cleared pt for physical therapy. Pt sitting on couch upon arrival and agreed to participate in therapy. Pt completed functional mobility as noted above. Verbally instructed pt in car transfer technique. Pt with good understanding . Impulsivity/decreased safety  at times noted with functional mobility. Discussed equipment with pt and notified  . Pt returned to chair with call light in reach. Treatment:  Patient practiced and was instructed in the following treatment:     Transfers - Verbal instruction for hand placement and technique.  Gait - Verbal instruction for walker safety and approximation to improve safety.  Stair negotiation - verbal instruction for sequencing and technique to improve safety and balance. Assistance required to complete task. Pt's/ family goals   1. To be able to urinate and go home. Patient and or family understand(s) diagnosis, prognosis, and plan of care. Yes     PLAN:    PLAN OF CARE:    Current Treatment Recommendations     [x] Strengthening     [x] ROM   [] Balance Training   [] Endurance Training   [x] Transfer Training   [x] Gait Training   [x] Stair Training   [x] Positioning   [x] Safety and Education Training   [x] Patient/Caregiver Education   [x] HEP  [] Other     PT care will be provided in accordance with the objectives noted above. Exercises and functional mobility practice will be used as well as appropriate assistive devices or modalities to obtain goals. Patient and family education will also be administered as needed.     Frequency of treatments: 2-5x/week x 1-2 weeks. Time in:  1105  Time out:  1130    Total Treatment Time:  25minutes     Evaluation Time includes thorough review of current medical information, gathering information on past medical history/social history and prior level of function, completion of standardized testing/informal observation of tasks, assessment of data and education on plan of care and goals.     CPT codes:  [] Low Complexity PT evaluation 95904  [] Moderate Complexity PT evaluation 09876  [] High Complexity PT evaluation 88889  [] PT Re-evaluation 98522  [] Gait training 38130 - 0 minutes  [] Manual therapy 68399 - 0 minutes  [x] Therapeutic activities 27755 - 25 minutes  [] Therapeutic exercises 00793 - 0 minutes  [] Neuromuscular reeducation 68861 - 0 minutes     Bakari Madrid [id number]

## 2021-10-11 NOTE — CARE COORDINATION
10/11/2021 social work transition of care planning  Sw spoke with pt at bedside. Pt plan remains for home. Pt transport is coming to take home. Pt has walker and will obtain shower chair on his own. Per brandon,no need for raised toilet seat. Will need home care orders for PT/OT.   Electronically signed by YULIA Segal on 10/11/2021 at 11:48 AM

## 2021-10-19 ENCOUNTER — TELEPHONE (OUTPATIENT)
Dept: ORTHOPEDIC SURGERY | Age: 71
End: 2021-10-19

## 2021-10-19 NOTE — TELEPHONE ENCOUNTER
Betty Piper from [facility] nursing called office with concerns of infection to R elbow. Patient has increased redness and pain. The laceration was sutured in the ER. Per Dr. Awilda Betts note on 10/09/21, \"rightUE: Dressing over elbow. Full ROM without pain to the elbow. Point tenderness to the laceration site. -TTP to fingers, hand, wrist, forearm, humerus, shoulder or clavicle. \"     Patient requesting to be seen sooner for right elbow. Routed to providers for review. Date of Procedure: 10/9/2021  Pre-Op Diagnosis: Right displaced femoral neck fracture  Post-Op Diagnosis: Same  Procedure(s):  RIGHT HIP HEMIARTHROPLASTY  Surgeon(s):  Booker Rooney MD    XR elbow right  Impression   1. There is no fracture dislocation of the right elbow.  If there is   continued pain follow-up x-ray is recommended to evaluate for an occult   fracture of the radial head. 2. Dorsal soft tissue swelling.        Future Appointments   Date Time Provider Sander Dc   10/25/2021  8:00 AM SCHEDULE, SE ORTHO [facility]

## 2021-10-19 NOTE — TELEPHONE ENCOUNTER
Recommend return to ED. We are seeing him for his R hip and if no fractures/dislocations to the elbow, he can follow up with his PCP for this if only a laceration.

## 2021-10-20 NOTE — TELEPHONE ENCOUNTER
Discussed with Fernando Park. She stated patient is agreeable to visiting urgent care/ER.  Patient does not have a PCP, but does use the [place]    Future Appointments   Date Time Provider Sander Dc   10/25/2021  8:00 AM SCHEDULE, SE ORTHO [facility]

## 2021-10-21 NOTE — DISCHARGE SUMMARY
Physician Discharge Summary    Patient ID:  Ramez Jimenez  [id number]  68 y.o.  1950    Admit date: 10/9/2021    Discharge date and time: 10/11/2021 12:30 PM     Admitting Physician: Jose Newman MD     Discharge Physician: Jose Newman MD    Admission Diagnoses: Closed fracture of right hip, initial encounter [facility]) [S72.001A]  Hip fracture requiring operative repair, right, closed, initial encounter [facility]) [S72.001A]    Discharge Diagnoses: s/p right RIGHT HIP HEMIARTHROPLASTY    Admission Condition: stable    Discharged Condition: good    Hospital Course: The patient was admitted on 10/9/2021. The patient underwent an uneventful course of right RIGHT HIP HEMIARTHROPLASTY by Jose Newman MD on 10/9/21. The patient was subsequently taken to the PACU and the floor in stable condition. The patient was started on pain control, DVT prophylaxis, antibiotics, and physical therapy as indicated. Blood counts were followed as needed. Discharge planning was performed and tailored in regards to patient progress, therapy progress, home needs, and support.  Once the patient had made appropriate gains, they were able to be discharged    Treatments: s/p right RIGHT HIP HEMIARTHROPLASTY    Medications:  Medications Discontinued During This Encounter   Medication Reason    oxyCODONE-acetaminophen (PERCOCET) 5-325 MG per tablet 1 tablet     morphine sulfate (PF) injection 4 mg     sodium chloride flush 0.9 % injection 1-93 mL DUPLICATE    sodium chloride flush 0.9 % injection 1-54 mL DUPLICATE    0.9 % sodium chloride infusion DUPLICATE    sodium chloride bacteriostatic 0.9% 30 mL with bupivacaine (MARCAINE) 30 mL, ketorolac (TORADOL) 30 mg Therapy completed    povidone-iodine 5 % ophthalmic solution Therapy completed    tobramycin (NEBCIN) injection Therapy completed    ceFAZolin (ANCEF) 2000 mg in sterile water 20 mL IV syringe Patient Transfer    tranexamic acid (CYKLOKAPRON) 1,000 mg in dextrose 5 % 100 mL IVPB Patient Transfer    meperidine (DEMEROL) injection 12.5 mg Patient Transfer    HYDROmorphone (DILAUDID) injection 0.25 mg Patient Transfer    HYDROmorphone (DILAUDID) injection 0.5 mg Patient Transfer    morphine (PF) injection 1 mg Patient Transfer    morphine (PF) injection 2 mg Patient Transfer    oxyCODONE-acetaminophen (PERCOCET) 5-325 MG per tablet 1 tablet Patient Transfer    oxyCODONE-acetaminophen (PERCOCET) 5-325 MG per tablet 2 tablet Patient Transfer    ondansetron (ZOFRAN) injection 4 mg Patient Transfer    tamsulosin (FLOMAX) capsule 0.4 mg Patient Discharge    aspirin EC tablet 325 mg Patient Discharge    morphine sulfate (PF) injection 4 mg Patient Discharge    morphine (PF) injection 2 mg Patient Discharge    oxyCODONE (ROXICODONE) immediate release tablet 10 mg Patient Discharge    oxyCODONE (ROXICODONE) immediate release tablet 5 mg Patient Discharge    0.9 % sodium chloride infusion Patient Discharge    sodium chloride flush 0.9 % injection 5-40 mL Patient Discharge    sodium chloride flush 0.9 % injection 5-40 mL Patient Discharge    polyethylene glycol (GLYCOLAX) packet 17 g Patient Discharge    ondansetron (ZOFRAN) injection 4 mg Patient Discharge         Disposition: The patient was provided instructions to continue antibiotics, pain medication, DVT prophylaxis, Dressing changes as applicable. The patient was instructed on restrictions and activities. The patient was to follow up with Mikaela Cristina MD, and to call the office for an appointment.       Signed:  Garry Vincent DO  10/21/2021  3:10 PM

## 2021-10-22 DIAGNOSIS — S59.901A INJURY OF RIGHT ELBOW, INITIAL ENCOUNTER: ICD-10-CM

## 2021-10-22 DIAGNOSIS — S72.001A CLOSED FRACTURE OF RIGHT HIP, INITIAL ENCOUNTER (HCC): Primary | ICD-10-CM

## 2021-10-25 ENCOUNTER — OFFICE VISIT (OUTPATIENT)
Dept: ORTHOPEDIC SURGERY | Age: 71
End: 2021-10-25
Payer: MEDICARE

## 2021-10-25 ENCOUNTER — HOSPITAL ENCOUNTER (OUTPATIENT)
Dept: GENERAL RADIOLOGY | Age: 71
Discharge: HOME | End: 2021-10-27
Payer: MEDICARE

## 2021-10-25 VITALS — TEMPERATURE: 98.8 F

## 2021-10-25 DIAGNOSIS — S59.901A INJURY OF RIGHT ELBOW, INITIAL ENCOUNTER: ICD-10-CM

## 2021-10-25 DIAGNOSIS — Z96.641 HISTORY OF RIGHT HIP HEMIARTHROPLASTY: Primary | ICD-10-CM

## 2021-10-25 DIAGNOSIS — S72.001A CLOSED FRACTURE OF RIGHT HIP, INITIAL ENCOUNTER (HCC): ICD-10-CM

## 2021-10-25 PROCEDURE — 73502 X-RAY EXAM HIP UNI 2-3 VIEWS: CPT

## 2021-10-25 PROCEDURE — 73080 X-RAY EXAM OF ELBOW: CPT

## 2021-10-25 PROCEDURE — 99024 POSTOP FOLLOW-UP VISIT: CPT | Performed by: PHYSICIAN ASSISTANT

## 2021-10-25 PROCEDURE — 99212 OFFICE O/P EST SF 10 MIN: CPT | Performed by: PHYSICIAN ASSISTANT

## 2021-10-25 NOTE — PROGRESS NOTES
Humberto Pimentel is a 79 y.o. male who presents for follow up of Right hip jerrica and a right elbow laceration that was sutured in the ED    SURGEON: Dr. Rajendra Thornton MD  Date of Injury/Surgery: 10/9/2021  Date last seen in office: First pre-op for Right hip jerrica. Symptoms: better  New complaints: Complaints of severe constipation. Patient goes to [address] [place] [address] for primary care. States he took the antibiotic for his right elbow for \"about 5 days\". Has been taking 325 mg  ASA. Wants to know how long to stay on it. Right hip \"feels good\". Gait slow and moderate limp. Right elbow \"went to Urgent care and they removed the sutures last week\" . Appears scabbed over. Not approximated. Cast/Splint, Brace, or Dressings: None    Weightbearing: right lower Full weight bearing      Assistive device walker at home but states he hasn't used it the last few days. Participating in therapy (location if yes)?  yes, [facility]    Refills Needed: None  Order/Referral Needed: Unsure

## 2021-10-25 NOTE — PROGRESS NOTES
Chief Complaint   Patient presents with    Follow Up After Procedure     DOS 10/9/21 TTS Rt hip jerrica       OP:SURGEON: Dr. Luís Verma MD  DATE OF PROCEDURE: 10/25/21  PROCEDURE:RIGHT HIP HEMIARTHROPLASTY    Subjective:  Sydney Mooney is approximately 2 weeks from above DOS. He is WBAT R LE and currently participating in home PT. Ambulates with walker. No drainage, warmth, or erythema about the R hip incision or R elbow laceration and states his sutures were removed recently. Denies any pain at the elbow or R hip. Not taking any pain medications. Denies paresthesias. Denies loss of ROM at the elbow. Denies fever, chills, malaise. Still taking ASA for DVT prophylaxis. Says he has several stool softeners/laxatives at home, but biggest complaints today seem to be constipation and frequent urination from Flomax. Review of Systems -  all pertinent positives and negatives in HPI. Objective:    General: Alert and oriented X 3, normocephalic atraumatic, external ears and eye normal, sclera clear, no acute distress, respirations easy and unlabored with no audible wheezes, skin warm and dry, speech and dress appropriate for noted age, affect euthymic. Extremity:  Right Lower Extremity  Skin clean dry and intact, without signs of infection  Incisions well approximated without signs of redness, warmth or drainage- staples intact  No edema noted  nontender about the incision line and about the hip to palpation   Compartments supple throughout thigh and leg  Calf supple and nontender  Demonstrates active knee flexion/extension, ankle plantar/dorsiflexion/great toe extension. States sensation intact to touch in sural/deep peroneal/superficial peroneal/saphenous/posterior tibial nerve distributions to foot/ankle. Palpable dorsalis pedis and posterior tibialis pulses, cap refill brisk in toes, foot warm/perfused.     R elbow with small crust without erythema, warmth, or drainage, nontender to palpation, full AROM at the R elbow              Temp 98.8 °F (37.1 °C) (Oral)     XR:   3 views of R Hip and pelvis demonstrating R hip hemiarthroplasty  intact without interval displacement, loosening, or failure. No significant change in alignment. No acute fractures or dislocations or any other osseus abnormality identified. 3 views of R elbow demonstrating no acute fractures or dislocations or any other osseus abnormality identified. Assessment:   Diagnosis Orders   1. History of right hip hemiarthroplasty  XR HIP 2-3 VW W PELVIS RIGHT       Plan:   Reviewed x-rays with patient today in office    WBAT R LE with AD when needed    Continue ASA until 30 days post op   Continue PT - call to coordinate outpatient PT   Continue hip precautions until 6 weeks post op   [facility]    F/u 4 weeks XR R hip and pelvis       Electronically signed by Terrance Plata PA-C on 10/26/2021 at 4:19 PM  Note: This report was completed using [employer] voiced recognition software. Every effort has been made to ensure accuracy; however, inadvertent computerized transcription errors may be present.

## 2021-11-12 ENCOUNTER — OFFICE VISIT (OUTPATIENT)
Dept: PRIMARY CARE CLINIC | Age: 71
End: 2021-11-12
Payer: MEDICARE

## 2021-11-12 VITALS
HEART RATE: 69 BPM | RESPIRATION RATE: 18 BRPM | WEIGHT: 173.6 LBS | DIASTOLIC BLOOD PRESSURE: 72 MMHG | OXYGEN SATURATION: 100 % | SYSTOLIC BLOOD PRESSURE: 132 MMHG | BODY MASS INDEX: 27.9 KG/M2 | TEMPERATURE: 96.4 F | HEIGHT: 66 IN

## 2021-11-12 DIAGNOSIS — R32 URINARY INCONTINENCE, UNSPECIFIED TYPE: ICD-10-CM

## 2021-11-12 DIAGNOSIS — E66.3 OVERWEIGHT: ICD-10-CM

## 2021-11-12 DIAGNOSIS — Z23 NEED FOR INFLUENZA VACCINATION: ICD-10-CM

## 2021-11-12 DIAGNOSIS — R94.31 LEFT AXIS DEVIATION: ICD-10-CM

## 2021-11-12 DIAGNOSIS — K59.00 CONSTIPATION, UNSPECIFIED CONSTIPATION TYPE: Primary | ICD-10-CM

## 2021-11-12 DIAGNOSIS — R14.0 ABDOMINAL DISTENSION: ICD-10-CM

## 2021-11-12 DIAGNOSIS — R14.0 BLOATING: ICD-10-CM

## 2021-11-12 DIAGNOSIS — R06.09 DOE (DYSPNEA ON EXERTION): ICD-10-CM

## 2021-11-12 LAB
BILIRUBIN, POC: NEGATIVE
BLOOD URINE, POC: NEGATIVE
CHOLESTEROL, TOTAL: 169 MG/DL (ref 0–199)
CLARITY, POC: CLEAR
COLOR, POC: YELLOW
GLUCOSE URINE, POC: NEGATIVE
HDLC SERPL-MCNC: 36 MG/DL
KETONES, POC: NEGATIVE
LDL CHOLESTEROL CALCULATED: 111 MG/DL (ref 0–99)
LEUKOCYTE EST, POC: NEGATIVE
NITRITE, POC: NEGATIVE
PH, POC: 5.5
PROSTATE SPECIFIC ANTIGEN: 4.09 NG/ML (ref 0–4)
PROTEIN, POC: NEGATIVE
SPECIFIC GRAVITY, POC: 1.01
TRIGL SERPL-MCNC: 112 MG/DL (ref 0–149)
UROBILINOGEN, POC: 0.2
VLDLC SERPL CALC-MCNC: 22 MG/DL

## 2021-11-12 PROCEDURE — 93000 ELECTROCARDIOGRAM COMPLETE: CPT | Performed by: STUDENT IN AN ORGANIZED HEALTH CARE EDUCATION/TRAINING PROGRAM

## 2021-11-12 PROCEDURE — G8417 CALC BMI ABV UP PARAM F/U: HCPCS | Performed by: STUDENT IN AN ORGANIZED HEALTH CARE EDUCATION/TRAINING PROGRAM

## 2021-11-12 PROCEDURE — G8484 FLU IMMUNIZE NO ADMIN: HCPCS | Performed by: STUDENT IN AN ORGANIZED HEALTH CARE EDUCATION/TRAINING PROGRAM

## 2021-11-12 PROCEDURE — 1123F ACP DISCUSS/DSCN MKR DOCD: CPT | Performed by: STUDENT IN AN ORGANIZED HEALTH CARE EDUCATION/TRAINING PROGRAM

## 2021-11-12 PROCEDURE — 4004F PT TOBACCO SCREEN RCVD TLK: CPT | Performed by: STUDENT IN AN ORGANIZED HEALTH CARE EDUCATION/TRAINING PROGRAM

## 2021-11-12 PROCEDURE — G8427 DOCREV CUR MEDS BY ELIG CLIN: HCPCS | Performed by: STUDENT IN AN ORGANIZED HEALTH CARE EDUCATION/TRAINING PROGRAM

## 2021-11-12 PROCEDURE — 3017F COLORECTAL CA SCREEN DOC REV: CPT | Performed by: STUDENT IN AN ORGANIZED HEALTH CARE EDUCATION/TRAINING PROGRAM

## 2021-11-12 PROCEDURE — 90694 VACC AIIV4 NO PRSRV 0.5ML IM: CPT | Performed by: STUDENT IN AN ORGANIZED HEALTH CARE EDUCATION/TRAINING PROGRAM

## 2021-11-12 PROCEDURE — 81002 URINALYSIS NONAUTO W/O SCOPE: CPT | Performed by: STUDENT IN AN ORGANIZED HEALTH CARE EDUCATION/TRAINING PROGRAM

## 2021-11-12 PROCEDURE — 4040F PNEUMOC VAC/ADMIN/RCVD: CPT | Performed by: STUDENT IN AN ORGANIZED HEALTH CARE EDUCATION/TRAINING PROGRAM

## 2021-11-12 PROCEDURE — G0008 ADMIN INFLUENZA VIRUS VAC: HCPCS | Performed by: STUDENT IN AN ORGANIZED HEALTH CARE EDUCATION/TRAINING PROGRAM

## 2021-11-12 PROCEDURE — 99214 OFFICE O/P EST MOD 30 MIN: CPT | Performed by: STUDENT IN AN ORGANIZED HEALTH CARE EDUCATION/TRAINING PROGRAM

## 2021-11-12 RX ORDER — POLYETHYLENE GLYCOL 3350 17 G/17G
17 POWDER, FOR SOLUTION ORAL DAILY
Qty: 1530 G | Refills: 1 | Status: SHIPPED | OUTPATIENT
Start: 2021-11-12 | End: 2021-12-12

## 2021-11-12 SDOH — ECONOMIC STABILITY: FOOD INSECURITY: WITHIN THE PAST 12 MONTHS, THE FOOD YOU BOUGHT JUST DIDN'T LAST AND YOU DIDN'T HAVE MONEY TO GET MORE.: NEVER TRUE

## 2021-11-12 SDOH — ECONOMIC STABILITY: FOOD INSECURITY: WITHIN THE PAST 12 MONTHS, YOU WORRIED THAT YOUR FOOD WOULD RUN OUT BEFORE YOU GOT MONEY TO BUY MORE.: NEVER TRUE

## 2021-11-12 ASSESSMENT — PATIENT HEALTH QUESTIONNAIRE - PHQ9
SUM OF ALL RESPONSES TO PHQ QUESTIONS 1-9: 2
SUM OF ALL RESPONSES TO PHQ QUESTIONS 1-9: 2
SUM OF ALL RESPONSES TO PHQ9 QUESTIONS 1 & 2: 2
2. FEELING DOWN, DEPRESSED OR HOPELESS: 1
SUM OF ALL RESPONSES TO PHQ QUESTIONS 1-9: 2
1. LITTLE INTEREST OR PLEASURE IN DOING THINGS: 1

## 2021-11-12 ASSESSMENT — SOCIAL DETERMINANTS OF HEALTH (SDOH): HOW HARD IS IT FOR YOU TO PAY FOR THE VERY BASICS LIKE FOOD, HOUSING, MEDICAL CARE, AND HEATING?: NOT HARD AT ALL

## 2021-11-12 NOTE — PROGRESS NOTES
[facility]  Department of Family Medicine      Patient:  Jessy Fairchild 70 y.o. male     Date of Service: 11/12/21      Chief complaint:   Chief Complaint   Patient presents with   [address]         History ofPresent Illness   The patient is a 70 y.o. male  presented to the clinic with complaints as above. NTP  -recently (5 weeks ago), had right hip surgery due to fracture from fall and is following with ortho, however is otherwise healthy  -currently, states he is having a lot of issues with constipation ever since the hip surgery  -currently, has a bowel movement once a day however has to strain a lot, has not noticed any blood  -feels a lot off bloating and abdominal distension  -also having issues with urinating, noticing that he is wetting the bed a lot, goes through 3-4 depends a night, tried flomax however did not help  -denies any dysuria or hematuria   -has associated chills and nocturia   -has ESPARZA which seems to have started after the hip surgery, does not get chest pain with it, does get lightheadedness and dizziness with it   -happy belated birthday   -last c scope was through the [address], has been 7 years, was normal and needed repeat in 10 years  -does use chew less tobacco  -did smoke for 15-20 years, however quit 25 years  -states he has had unintentional weight loss of 10 pounds in last several weeks however states he does not have an appetite     Past Medical History:  History reviewed. No pertinent past medical history. PastSurgical History:        Procedure Laterality Date    HIP SURGERY Right 10/9/2021    RIGHT HIP HEMIARTHROPLASTY [place]ell [place] [facility]       Allergies:    Shellfish-derived products    Social History:   Social History     Socioeconomic History    Marital status:       Spouse name: Not on file    Number of children: Not on file    Years of education: Not on file    Highest education level: Not on file Occupational History    Not on file   Tobacco Use    Smoking status: Former Smoker     Packs/day: 1.00     Years: 20.00     Pack years: 20.00     Quit date:      Years since quittin.8    Smokeless tobacco: Current User     Types: Chew   Substance and Sexual Activity    Alcohol use: Yes     Comment: couple glasses daily     Drug use: Not Currently    Sexual activity: Not on file   Other Topics Concern    Not on file   Social History Narrative    Not on file     Social Determinants of Health     Financial Resource Strain: Low Risk     Difficulty of Paying Living Expenses: Not hard at all   Food Insecurity: No Food Insecurity    Worried About [facility] in the Last Year: Never true    [facility] in the Last Year: Never true   Transportation Needs:     Lack of Transportation (Medical): Not on file    Lack of Transportation (Non-Medical):  Not on file   Physical Activity:     Days of Exercise per Week: Not on file    Minutes of Exercise per Session: Not on file   Stress:     Feeling of Stress : Not on file   Social Connections:     Frequency of Communication with Friends and Family: Not on file    Frequency of Social Gatherings with Friends and Family: Not on file    Attends Judaism Services: Not on file    Active Member of [address] [place] [address] [employer] or Organizations: Not on file    Attends Club or Organization Meetings: Not on file    Marital Status: Not on file   Intimate Partner Violence:     Fear of Current or Ex-Partner: Not on file    Emotionally Abused: Not on file    Physically Abused: Not on file    Sexually Abused: Not on file   Housing Stability:     Unable to Pay for Housing in the Last Year: Not on file    Number of [place] in the Last Year: Not on file    Unstable Housing in the Last Year: Not on file        Family History:       Problem Relation Age of Onset    Other Mother         uterine cancer    Other Maternal Grandfather 80        heart attacks       Review of Systems:   Review of Systems - as above     Physical Exam   Vitals: /72   Pulse 69   Temp 96.4 °F (35.8 °C) (Temporal)   Resp 18   Ht 5' 6\" (1.676 m)   Wt 173 lb 9.6 oz (78.7 kg)   SpO2 100%   BMI 28.02 kg/m²   Physical Exam  Constitutional:       Appearance: He is well-developed. HENT:      Head: Normocephalic and atraumatic. Eyes:      General:         Right eye: No discharge. Left eye: No discharge. Conjunctiva/sclera: Conjunctivae normal.   Neck:      Trachea: No tracheal deviation. Cardiovascular:      Rate and Rhythm: Normal rate and regular rhythm. Heart sounds: Normal heart sounds. Pulmonary:      Effort: Pulmonary effort is normal. No respiratory distress. Breath sounds: Normal breath sounds. No wheezing. Abdominal:      General: Bowel sounds are normal. There is distension. Palpations: Abdomen is soft. Tenderness: There is abdominal tenderness (minimal, general). Musculoskeletal:         General: No tenderness. Cervical back: Normal range of motion and neck supple. Skin:     General: Skin is warm and dry. Neurological:      Mental Status: He is alert. Psychiatric:         Behavior: Behavior normal.           Assessment and Plan       1. Constipation, unspecified constipation type  New issue  -Seems likely related to post op constipation given he just had hip surgery  -Constipation seems to be causing bloating and abdominal distension and exacerbating his urinary issues, therefore will see if treating the constipation improves all his other issues  -Will refer to urology given his urinary incontinence  -UA in office was normal  -Repeat PSA done   -KUB for further evaluation of abdominal distension, will treat constipation with PEG, if no improvement will consider referring to gen surg   - XR ABDOMEN (KUB) (SINGLE AP VIEW); Future  - polyethylene glycol (GLYCOLAX) 17 GM/SCOOP powder;  Take 17 g by mouth daily  Dispense: 1530 g; Refill: 1    2. Abdominal distension  As above  - XR ABDOMEN (KUB) (SINGLE AP VIEW); Future  - polyethylene glycol (GLYCOLAX) 17 GM/SCOOP powder; Take 17 g by mouth daily  Dispense: 1530 g; Refill: 1    3. Bloating  As above  - XR ABDOMEN (KUB) (SINGLE AP VIEW); Future  - polyethylene glycol (GLYCOLAX) 17 GM/SCOOP powder; Take 17 g by mouth daily  Dispense: 1530 g; Refill: 1    4. Urinary incontinence, unspecified type  As above  - POCT Urinalysis no Micro  - US URINARY BLADDER LIMITED; Future  - PSA, DIAGNOSTIC; Future  - AFL - Salima Holloway MD, Urology, [place]    5. ESPARZA (dyspnea on exertion)  New issue  -Seems to have started after surgery, however given other associated symptoms, needs further worked up  -EKG was sinus rhythm however showed LAD  -Given his other symptoms will further work up with echo and stress, cannot due exercise as he just had hip surgery and would not tolerate this  - EKG 12 lead; Future  - EKG 12 lead  - Echocardiogram complete; Future  - Cardiac Stress Test - w/Pharm; Future    6. Overweight  HCM:  - Lipid Panel; Future    7. Left axis deviation  Discussion as above  - Echocardiogram complete; Future  - Cardiac Stress Test - w/Pharm; Future    8. Need for influenza vaccination  HCM:   - INFLUENZA, QUADV, ADJUVANTED, 72 YRS =, IM, PF, PREFILL SYR, 0.5ML (FLUAD)      Counseled regarding above diagnosis, including possible risks and complications,  especially if left uncontrolled. Counseled regarding the possible side effects, risks, benefits and alternatives to treatment;patient and/or guardian verbalizes understanding, agrees, feels comfortable with and wishes to proceed with above treatment plan. Call or go to [address] if symptoms worsen or persist. Advised patient to call with any new medication issues, and, as applicable, read all Rx info from pharmacy to assure aware of all possible risks and side effects of medicationbefore taking.      Patient and/or guardian given opportunity to ask questions/raise concerns. The patient verbalized comfort and understanding ofinstructions. I encourage further reading and education about your health conditions. Information on many health conditions is provided by [facility] [employer]: [email or web address]  Please bring any questions to me at your nextvisit. Return to Office: Return in about 1 month (around 12/12/2021) for f/u constipation . Medication List:    Current Outpatient Medications   Medication Sig Dispense Refill    polyethylene glycol (GLYCOLAX) 17 GM/SCOOP powder Take 17 g by mouth daily 1530 g 1    tamsulosin (FLOMAX) 0.4 MG capsule Take 1 capsule by mouth daily (Patient not taking: Reported on 11/12/2021) 30 capsule 3    aspirin 325 MG EC tablet Take 1 tablet by mouth 2 times daily for 28 days (Patient not taking: Reported on 11/12/2021) 56 tablet 0     No current facility-administered medications for this visit. Elise Smith, DO       This document may have been prepared at least partially through the use of voice recognition software. Although effort is taken to assure the accuracy ofthis document, it is possible that grammatical, syntax,  or spelling errors may occur.

## 2021-11-22 ENCOUNTER — HOSPITAL ENCOUNTER (OUTPATIENT)
Dept: GENERAL RADIOLOGY | Age: 71
Discharge: HOME | End: 2021-11-24
Payer: MEDICARE

## 2021-11-22 ENCOUNTER — OFFICE VISIT (OUTPATIENT)
Dept: ORTHOPEDIC SURGERY | Age: 71
End: 2021-11-22
Payer: MEDICARE

## 2021-11-22 VITALS — TEMPERATURE: 98.3 F

## 2021-11-22 DIAGNOSIS — Z96.641 HISTORY OF RIGHT HIP HEMIARTHROPLASTY: ICD-10-CM

## 2021-11-22 DIAGNOSIS — Z96.641 HISTORY OF RIGHT HIP HEMIARTHROPLASTY: Primary | ICD-10-CM

## 2021-11-22 PROCEDURE — 99024 POSTOP FOLLOW-UP VISIT: CPT | Performed by: PHYSICIAN ASSISTANT

## 2021-11-22 PROCEDURE — 73502 X-RAY EXAM HIP UNI 2-3 VIEWS: CPT

## 2021-11-22 PROCEDURE — 99212 OFFICE O/P EST SF 10 MIN: CPT | Performed by: PHYSICIAN ASSISTANT

## 2021-11-22 NOTE — PROGRESS NOTES
Chief Complaint   Patient presents with    Follow-up     6wk post-op R hip jerrica. Presents FWB. States pain controlled, denies numbness or tingling. Done with HH PT, doing HEP currently.  Other     R elbow fine per pt    Other     XR in EPIC        OP:SURGEON: Dr. Jannette Childress MD  DATE OF PROCEDURE: 10-9-21  PROCEDURE: RIGHT HIP HEMIARTHROPLASTY     POD: 6 weeks    Subjective:  Mcneil Moritz is following up from the above surgery. He is WBAT on right lower extremity. He ambulates with no assistive device. Pain to extremity is none and is not taking prescribed pain medication. They denies numbness or tingling to the right lower extremity. Denies calf pain, chest pain, or shortness of breath. Patient has finished DVT prophylaxis. Patient is not participating in therapy at this time. Overall he is doing well after surgery. He denies any pain or problems in his right hip. Review of Systems -  All pertinent positives/negative in HPI     Objective:    General: Alert and oriented X 3, normocephalic atraumatic, external ears and eye normal, sclera clear, no acute distress, respirations easy and unlabored with no audible wheezes, skin warm and dry, speech and dress appropriate for noted age, affect euthymic. Extremity:  Right Lower Extremity  Skin clean dry and intact, without signs of infection   Incision well healed  no edema noted  Compartments supple throughout thigh and leg  Calf supple and not tender  negative Homans  Demonstrates active motion with hip flexion/abduction. Good motion with knee flexion/extension. Ambulates with no assistive devices. States sensation intact to touch in sural, deep peroneal, superficial peroneal, saphenous, posterior tibial  nerve distributions to foot/ankle. Palpable dorsalis pedis and posterior tibialis pulses, cap refill brisk in toes, foot warm/perfused.     Temp 98.3 °F (36.8 °C)     XR:   Pelvis and right hip films demonstrate a right hip hemiarthroplasty with the hardware in stable position and alignment. No evidence of hardware loosening or failure. Assessment:   Diagnosis Orders   1. History of right hip hemiarthroplasty       Plan:  X-rays reviewed and discussed. Continue weightbearing as tolerated right lower extremity. Continue progressing with activities as tolerated. Follow-up as needed. Call if any questions or concerns    Electronically signed by SUSANA Macias on 11/22/2021 at 8:49 AM  Note: This report was completed using [employer] voiced recognition software. Every effort has been made to ensure accuracy; however, inadvertent computerized transcription errors may be present.

## 2021-11-23 ENCOUNTER — HOSPITAL ENCOUNTER (EMERGENCY)
Age: 71
Discharge: HOME | End: 2021-11-23

## 2021-11-23 ENCOUNTER — HOSPITAL ENCOUNTER (OUTPATIENT)
Dept: ULTRASOUND IMAGING | Age: 71
Discharge: HOME | End: 2021-11-25
Payer: MEDICARE

## 2021-11-23 VITALS
WEIGHT: 165 LBS | HEIGHT: 66 IN | TEMPERATURE: 98 F | SYSTOLIC BLOOD PRESSURE: 181 MMHG | DIASTOLIC BLOOD PRESSURE: 97 MMHG | RESPIRATION RATE: 14 BRPM | BODY MASS INDEX: 26.52 KG/M2 | OXYGEN SATURATION: 100 % | HEART RATE: 83 BPM

## 2021-11-23 DIAGNOSIS — R32 URINARY INCONTINENCE, UNSPECIFIED TYPE: ICD-10-CM

## 2021-11-23 PROCEDURE — 76775 US EXAM ABDO BACK WALL LIM: CPT

## 2021-12-01 DIAGNOSIS — R32 URINARY INCONTINENCE, UNSPECIFIED TYPE: Primary | ICD-10-CM

## 2021-12-04 ENCOUNTER — OFFICE VISIT (OUTPATIENT)
Dept: PRIMARY CARE CLINIC | Age: 71
End: 2021-12-04
Payer: MEDICARE

## 2021-12-04 VITALS
HEART RATE: 91 BPM | BODY MASS INDEX: 26.26 KG/M2 | HEIGHT: 66 IN | WEIGHT: 163.4 LBS | OXYGEN SATURATION: 100 % | SYSTOLIC BLOOD PRESSURE: 108 MMHG | TEMPERATURE: 96 F | RESPIRATION RATE: 17 BRPM | DIASTOLIC BLOOD PRESSURE: 68 MMHG

## 2021-12-04 DIAGNOSIS — R31.0 GROSS HEMATURIA: ICD-10-CM

## 2021-12-04 DIAGNOSIS — R42 DIZZINESS: ICD-10-CM

## 2021-12-04 DIAGNOSIS — K59.00 CONSTIPATION, UNSPECIFIED CONSTIPATION TYPE: ICD-10-CM

## 2021-12-04 DIAGNOSIS — I95.1 ORTHOSTATIC HYPOTENSION: Primary | ICD-10-CM

## 2021-12-04 LAB
ANION GAP SERPL CALCULATED.3IONS-SCNC: 11 MMOL/L (ref 7–16)
BUN BLDV-MCNC: 34 MG/DL (ref 6–23)
CALCIUM SERPL-MCNC: 9.2 MG/DL (ref 8.6–10.2)
CHLORIDE BLD-SCNC: 99 MMOL/L (ref 98–107)
CO2: 26 MMOL/L (ref 22–29)
CREAT SERPL-MCNC: 2.9 MG/DL (ref 0.7–1.2)
GFR AFRICAN AMERICAN: 26
GFR NON-AFRICAN AMERICAN: 22 ML/MIN/1.73
GLUCOSE BLD-MCNC: 104 MG/DL (ref 74–99)
HCT VFR BLD CALC: 34.7 % (ref 37–54)
HEMOGLOBIN: 11.5 G/DL (ref 12.5–16.5)
HGB, POC: 10.3
MCH RBC QN AUTO: 29.1 PG (ref 26–35)
MCHC RBC AUTO-ENTMCNC: 33.1 % (ref 32–34.5)
MCV RBC AUTO: 87.8 FL (ref 80–99.9)
PDW BLD-RTO: 12.9 FL (ref 11.5–15)
PLATELET # BLD: 144 E9/L (ref 130–450)
PMV BLD AUTO: 12.7 FL (ref 7–12)
POTASSIUM SERPL-SCNC: 5 MMOL/L (ref 3.5–5)
RBC # BLD: 3.95 E12/L (ref 3.8–5.8)
SODIUM BLD-SCNC: 136 MMOL/L (ref 132–146)
WBC # BLD: 9.9 E9/L (ref 4.5–11.5)

## 2021-12-04 PROCEDURE — 4004F PT TOBACCO SCREEN RCVD TLK: CPT | Performed by: STUDENT IN AN ORGANIZED HEALTH CARE EDUCATION/TRAINING PROGRAM

## 2021-12-04 PROCEDURE — 1123F ACP DISCUSS/DSCN MKR DOCD: CPT | Performed by: STUDENT IN AN ORGANIZED HEALTH CARE EDUCATION/TRAINING PROGRAM

## 2021-12-04 PROCEDURE — G8484 FLU IMMUNIZE NO ADMIN: HCPCS | Performed by: STUDENT IN AN ORGANIZED HEALTH CARE EDUCATION/TRAINING PROGRAM

## 2021-12-04 PROCEDURE — 3017F COLORECTAL CA SCREEN DOC REV: CPT | Performed by: STUDENT IN AN ORGANIZED HEALTH CARE EDUCATION/TRAINING PROGRAM

## 2021-12-04 PROCEDURE — 93000 ELECTROCARDIOGRAM COMPLETE: CPT | Performed by: STUDENT IN AN ORGANIZED HEALTH CARE EDUCATION/TRAINING PROGRAM

## 2021-12-04 PROCEDURE — G8427 DOCREV CUR MEDS BY ELIG CLIN: HCPCS | Performed by: STUDENT IN AN ORGANIZED HEALTH CARE EDUCATION/TRAINING PROGRAM

## 2021-12-04 PROCEDURE — 99214 OFFICE O/P EST MOD 30 MIN: CPT | Performed by: STUDENT IN AN ORGANIZED HEALTH CARE EDUCATION/TRAINING PROGRAM

## 2021-12-04 PROCEDURE — 85018 HEMOGLOBIN: CPT | Performed by: STUDENT IN AN ORGANIZED HEALTH CARE EDUCATION/TRAINING PROGRAM

## 2021-12-04 PROCEDURE — G8417 CALC BMI ABV UP PARAM F/U: HCPCS | Performed by: STUDENT IN AN ORGANIZED HEALTH CARE EDUCATION/TRAINING PROGRAM

## 2021-12-04 PROCEDURE — 4040F PNEUMOC VAC/ADMIN/RCVD: CPT | Performed by: STUDENT IN AN ORGANIZED HEALTH CARE EDUCATION/TRAINING PROGRAM

## 2021-12-04 RX ORDER — MINERAL OIL
1 ENEMA (ML) RECTAL ONCE
Qty: 1 EACH | Refills: 0 | Status: SHIPPED | OUTPATIENT
Start: 2021-12-04 | End: 2021-12-04

## 2021-12-04 NOTE — PROGRESS NOTES
[facility]  Department of Family Medicine      Patient:  Jaime Valdez 70 y.o. male     Date of Service: 12/4/21      Chief complaint:   Chief Complaint   Patient presents with    Dizziness    Hematuria         History ofPresent Illness   The patient is a 70 y.o. male  presented to the clinic with complaints as above. Dizziness  -chronic issue  -worsened in last several weeks  -believes its because he wasn't eating as much  -states he is eating more now   -had a couple episodes of dizziness yesterday which made him grab a hold of the door wall to stabilize him  -dizziness described as room spinning and a feeling of lightheadedness  -feels as if he keeps standing he may pass out  -no associated chest pain, numbness, or tingling   -has associated SOB  -episodes resolved within seconds   -recently increased flomax from daily to BID   -of note, had holliday catheter inserted as he had urinary retention, this drained over 900 mL's  -noticed a lot of blood in his catheter bag, unable to tell if this is improving or not  -having a lot of SOB and fatigue, feels very winded even going down the stairs   -of note, amount of draining urine from catheter has not changed     Constipation  -f/u  -still having small bowel movements and a lot of straining  -denies any blood     Past Medical History:  No past medical history on file. PastSurgical History:        Procedure Laterality Date    HIP SURGERY Right 10/9/2021    RIGHT HIP HEMIARTHROPLASTY performed by Cj Waite MD at [facility] OR       Allergies:    Shellfish-derived products    Social History:   Social History     Socioeconomic History    Marital status:       Spouse name: Not on file    Number of children: Not on file    Years of education: Not on file    Highest education level: Not on file   Occupational History    Not on file   Tobacco Use    Smoking status: Former Smoker     Packs/day: 1.00     Years: 20.00 Pack years: 20.00     Quit date: 18     Years since quittin.9    Smokeless tobacco: Current User     Types: Chew   Substance and Sexual Activity    Alcohol use: Yes     Comment: couple glasses daily     Drug use: Not Currently    Sexual activity: Not on file   Other Topics Concern    Not on file   Social History Narrative    Not on file     Social Determinants of Health     Financial Resource Strain: Low Risk     Difficulty of Paying Living Expenses: Not hard at all   Food Insecurity: No Food Insecurity    Worried About [address] [employer] in the Last Year: Never true    [facility] in the Last Year: Never true   Transportation Needs:     Lack of Transportation (Medical): Not on file    Lack of Transportation (Non-Medical):  Not on file   Physical Activity:     Days of Exercise per Week: Not on file    Minutes of Exercise per Session: Not on file   Stress:     Feeling of Stress : Not on file   Social Connections:     Frequency of Communication with Friends and Family: Not on file    Frequency of Social Gatherings with Friends and Family: Not on file    Attends Orthodoxy Services: Not on file    Active Member of [address] [place] [address] or Organizations: Not on file    Attends Club or Organization Meetings: Not on file    Marital Status: Not on file   Intimate Partner Violence:     Fear of Current or Ex-Partner: Not on file    Emotionally Abused: Not on file    Physically Abused: Not on file    Sexually Abused: Not on file   Housing Stability:     Unable to Pay for Housing in the Last Year: Not on file    Number of [place] in the Last Year: Not on file    Unstable Housing in the Last Year: Not on file        Family History:       Problem Relation Age of Onset    Other Mother         uterine cancer    Other Maternal Grandfather 80        heart attacks       Review of Systems:   Review of Systems - as above     Physical Exam   Vitals: /68   Pulse 91   Temp 96 °F (35.6 °C) (Infrared) Resp 17   Ht 5' 6\" (1.676 m)   Wt 163 lb 6.4 oz (74.1 kg)   SpO2 100%   BMI 26.37 kg/m²   Physical Exam  Constitutional:       Appearance: He is well-developed. HENT:      Head: Normocephalic and atraumatic. Eyes:      General:         Right eye: No discharge. Left eye: No discharge. Conjunctiva/sclera: Conjunctivae normal.   Neck:      Trachea: No tracheal deviation. Cardiovascular:      Rate and Rhythm: Normal rate and regular rhythm. Heart sounds: Normal heart sounds. Pulmonary:      Effort: Pulmonary effort is normal. No respiratory distress. Breath sounds: Normal breath sounds. No wheezing. Abdominal:      General: Bowel sounds are normal. There is distension. Palpations: Abdomen is soft. Tenderness: There is no abdominal tenderness. Genitourinary:     Comments: Holliday catheter in place   Gross hematuria noted in holliday bag   Musculoskeletal:         General: No tenderness. Cervical back: Normal range of motion and neck supple. Skin:     General: Skin is warm and dry. Neurological:      Mental Status: He is alert. Psychiatric:         Behavior: Behavior normal.             Assessment and Plan       1. Dizziness  Chronic issue  -Seems to be worsening  -Orthostatics done in office and were positive (130/80 laying down, 106/70 sitting up, 80/60 standing), therefore flomax was completely stopped as risks of continuing outweighed benefits  -Advised patient stay better hydrated  - POCT hemoglobin  - EKG 12 lead; Future  - EKG 12 lead  - CBC; Future  - BASIC METABOLIC PANEL; Future    2. Orthostatic hypotension  As above    3.  Constipation, unspecified constipation type  F/u  -Not improving with miralax, given stool burden on xray and stool retention and rectal area, will trial fleet enema and will refer to Dr. Renetta Matthews for further evaluation and management  -consider escalating to mag citrate, however will avoid for now given patient's orthostatic hypotension - Emilee Patel MD, General Surgery, [place]  - mineral oil ([employer] MINERAL OIL ENEMA) enema; Place 1 enema rectally once for 1 dose  Dispense: 1 each; Refill: 0    4. Gross hematuria  New issue  -Likely secondary to holliday insertion, hemoglobin level in office was 10.3, likely contributing to patient's dizziness, however orthostatic hypotension likely secondary to flomax which was discontinued  -Will get more definitive CBC to see more accurate hemoglobin level and advised patient call urology today to discuss gross hematuria as he did have holliday put in place Wednesday and the gross hematuria has not resolved since then  - POCT hemoglobin  - CBC; Future        Counseled regarding above diagnosis, including possible risks and complications,  especially if left uncontrolled. Counseled regarding the possible side effects, risks, benefits and alternatives to treatment;patient and/or guardian verbalizes understanding, agrees, feels comfortable with and wishes to proceed with above treatment plan. Call or go to [address] if symptoms worsen or persist. Advised patient to call with any new medication issues, and, as applicable, read all Rx info from pharmacy to assure aware of all possible risks and side effects of medicationbefore taking. Patient and/or guardian given opportunity to ask questions/raise concerns. The patient verbalized comfort and understanding ofinstructions. I encourage further reading and education about your health conditions. Information on many health conditions is provided by [facility] [employer] of Family Physicians: [email or web address]  Please bring any questions to me at your nextvisit. Return to Office: Return in about 2 months (around 2/4/2022) for f/u constipation and orthostatic hypotension .     Medication List:    Current Outpatient Medications   Medication Sig Dispense Refill    mineral oil ([employer] MINERAL OIL ENEMA) enema Place 1 enema rectally once for 1 dose 1 each 0    polyethylene glycol (GLYCOLAX) 17 GM/SCOOP powder Take 17 g by mouth daily (Patient not taking: Reported on 12/4/2021) 1530 g 1    aspirin 325 MG EC tablet Take 1 tablet by mouth 2 times daily for 28 days (Patient not taking: Reported on 11/12/2021) 56 tablet 0     No current facility-administered medications for this visit. Elise Smith, DO       This document may have been prepared at least partially through the use of voice recognition software. Although effort is taken to assure the accuracy ofthis document, it is possible that grammatical, syntax,  or spelling errors may occur.

## 2021-12-06 DIAGNOSIS — R79.89 ELEVATED SERUM CREATININE: ICD-10-CM

## 2021-12-06 DIAGNOSIS — R79.89 ELEVATED SERUM CREATININE: Primary | ICD-10-CM

## 2021-12-06 LAB
ANION GAP SERPL CALCULATED.3IONS-SCNC: 13 MMOL/L (ref 7–16)
BUN BLDV-MCNC: 31 MG/DL (ref 6–23)
CALCIUM SERPL-MCNC: 9 MG/DL (ref 8.6–10.2)
CHLORIDE BLD-SCNC: 95 MMOL/L (ref 98–107)
CO2: 24 MMOL/L (ref 22–29)
CREAT SERPL-MCNC: 2.2 MG/DL (ref 0.7–1.2)
GFR AFRICAN AMERICAN: 36
GFR NON-AFRICAN AMERICAN: 30 ML/MIN/1.73
GLUCOSE BLD-MCNC: 154 MG/DL (ref 74–99)
POTASSIUM SERPL-SCNC: 4.3 MMOL/L (ref 3.5–5)
SODIUM BLD-SCNC: 132 MMOL/L (ref 132–146)

## 2021-12-13 ENCOUNTER — NURSE ONLY (OUTPATIENT)
Dept: PRIMARY CARE CLINIC | Age: 71
End: 2021-12-13

## 2021-12-13 DIAGNOSIS — R79.89 ELEVATED SERUM CREATININE: Primary | ICD-10-CM

## 2021-12-13 DIAGNOSIS — R79.89 ELEVATED SERUM CREATININE: ICD-10-CM

## 2021-12-13 LAB
ANION GAP SERPL CALCULATED.3IONS-SCNC: 12 MMOL/L (ref 7–16)
BUN BLDV-MCNC: 24 MG/DL (ref 6–23)
CALCIUM SERPL-MCNC: 9.1 MG/DL (ref 8.6–10.2)
CHLORIDE BLD-SCNC: 102 MMOL/L (ref 98–107)
CO2: 24 MMOL/L (ref 22–29)
CREAT SERPL-MCNC: 1.8 MG/DL (ref 0.7–1.2)
GFR AFRICAN AMERICAN: 45
GFR NON-AFRICAN AMERICAN: 37 ML/MIN/1.73
GLUCOSE BLD-MCNC: 107 MG/DL (ref 74–99)
POTASSIUM SERPL-SCNC: 5 MMOL/L (ref 3.5–5)
SODIUM BLD-SCNC: 138 MMOL/L (ref 132–146)

## 2021-12-16 ENCOUNTER — TELEPHONE (OUTPATIENT)
Dept: SURGERY | Age: 71
End: 2021-12-16

## 2021-12-16 NOTE — TELEPHONE ENCOUNTER
Voicemail left by patient. He is having urology issues and has chosen to have those addressed first. He will call to reschedule. Phone encounter entered for Dr Cat Metz and deferred referral until mid-January.

## 2022-01-11 ENCOUNTER — TELEPHONE (OUTPATIENT)
Dept: ORTHOPEDIC SURGERY | Age: 72
End: 2022-01-11

## 2022-01-11 NOTE — LETTER
[address]  [address] [place] [id number]  Phone: [phone]  Fax: [phone]    Vani Durant PA-C        January 11, 2022     Patient: Neftali Caller   YOB: 1950   Date of Visit: 1/11/2022       To Whom It May Concern: It is my medical opinion that Neftali Caller is ok from an Orthopedic point of view to have surgery on his prostate as long as appropriate antibiotics are given preoperatively. If you have any questions or concerns, please don't hesitate to call.     Sincerely,        Vani Durant PA-C

## 2022-01-11 NOTE — TELEPHONE ENCOUNTER
Pt left VM stating he needed letter faxed to Dr. William Roe [facility]) office saying ok for prostate surgery from orthopedic point of view. Letter drafted and faxed at this time.

## 2022-01-13 ENCOUNTER — HOSPITAL ENCOUNTER (OUTPATIENT)
Dept: NON INVASIVE DIAGNOSTICS | Age: 72
Discharge: HOME | End: 2022-01-13
Payer: MEDICARE

## 2022-01-13 ENCOUNTER — APPOINTMENT (OUTPATIENT)
Dept: NUCLEAR MEDICINE | Age: 72
End: 2022-01-13
Payer: MEDICARE

## 2022-01-13 ENCOUNTER — HOSPITAL ENCOUNTER (OUTPATIENT)
Dept: NON INVASIVE DIAGNOSTICS | Age: 72
End: 2022-01-13
Payer: MEDICARE

## 2022-01-13 LAB
LV EF: 63 %
LVEF MODALITY: NORMAL

## 2022-01-13 PROCEDURE — 93306 TTE W/DOPPLER COMPLETE: CPT

## 2022-01-14 NOTE — PROGRESS NOTES
[facility]  Department of Family Medicine      Patient:  Renetta Torres 70 y.o. male     Date of Service: 22      Chief complaint:   Chief Complaint   Patient presents with    Pre-op Exam         History ofPresent Illness   The patient is a 70 y.o. male  presented to the clinic with complaints as above. Pre op exam  -for TURP, get this 2022  -overall, healthy and improving from previous right hip surgery  -is now moving his bowels regular and eating regularly and getting his strength   -recent echo was done and looked to be ok  -having right hip pain, states he doesn't go through physical rehab as well as he should have, is walking and getting around   -no longer getting sob, dizziness, or stomach pain  -denies chest pain with walking around, no SOB or chest pain with going up multiple flights of stairs      Past Medical History:  No past medical history on file. PastSurgical History:        Procedure Laterality Date    HIP SURGERY Right 10/9/2021    RIGHT HIP HEMIARTHROPLASTY performed by Zoila Montenegro MD at [facility] OR       Allergies:    Shellfish-derived products    Social History:   Social History     Socioeconomic History    Marital status:       Spouse name: Not on file    Number of children: Not on file    Years of education: Not on file    Highest education level: Not on file   Occupational History    Not on file   Tobacco Use    Smoking status: Former Smoker     Packs/day: 1.00     Years: 20.00     Pack years: 20.00     Quit date:      Years since quittin.0    Smokeless tobacco: Current User     Types: Chew   Substance and Sexual Activity    Alcohol use: Yes     Comment: couple glasses daily     Drug use: Not Currently    Sexual activity: Not on file   Other Topics Concern    Not on file   Social History Narrative    Not on file     Social Determinants of Health     Financial Resource Strain: Low Risk     Difficulty of Paying Living Expenses: Not hard at all   Food Insecurity: No Food Insecurity    Worried About Running Out of Food in the Last Year: Never true    Ran Out of Food in the Last Year: Never true   Transportation Needs:     Lack of Transportation (Medical): Not on file    Lack of Transportation (Non-Medical): Not on file   Physical Activity:     Days of Exercise per Week: Not on file    Minutes of Exercise per Session: Not on file   Stress:     Feeling of Stress : Not on file   Social Connections:     Frequency of Communication with Friends and Family: Not on file    Frequency of Social Gatherings with Friends and Family: Not on file    Attends Sabianist Services: Not on file    Active Member of [address] [place] [address] [employer] or Organizations: Not on file    Attends Club or Organization Meetings: Not on file    Marital Status: Not on file   Intimate Partner Violence:     Fear of Current or Ex-Partner: Not on file    Emotionally Abused: Not on file    Physically Abused: Not on file    Sexually Abused: Not on file   Housing Stability:     Unable to Pay for Housing in the Last Year: Not on file    Number of [place] in the Last Year: Not on file    Unstable Housing in the Last Year: Not on file        Family History:       Problem Relation Age of Onset    Other Mother         uterine cancer    Other Maternal Grandfather 80        heart attacks       Review of Systems:   Review of Systems - as above     Physical Exam   Vitals: /66   Temp 96.5 °F (35.8 °C) (Infrared)   Resp 18   Ht 5' 6\" (1.676 m)   Wt 172 lb (78 kg)   SpO2 99%   BMI 27.76 kg/m²   Physical Exam  Constitutional:       Appearance: He is well-developed. HENT:      Head: Normocephalic and atraumatic. Eyes:      General:         Right eye: No discharge. Left eye: No discharge. Conjunctiva/sclera: Conjunctivae normal.   Neck:      Trachea: No tracheal deviation. Cardiovascular:      Rate and Rhythm: Normal rate and regular rhythm. Heart sounds: Normal heart sounds. Pulmonary:      Effort: Pulmonary effort is normal. No respiratory distress. Breath sounds: Normal breath sounds. No wheezing. Abdominal:      General: Bowel sounds are normal. There is distension. Palpations: Abdomen is soft. Tenderness: There is no abdominal tenderness. Musculoskeletal:         General: No tenderness. Cervical back: Normal range of motion and neck supple. Skin:     General: Skin is warm and dry. Neurological:      Mental Status: He is alert. Psychiatric:         Behavior: Behavior normal.             Assessment and Plan       1. Pre-op examination  For TURP  -Patient improving from recent right hip fracture, bowel movements improving, patient now walking more without any SOB, chest pain, or lightheadedness  -Given patient able to ambulate without chest pain or SOB and given the fact he had recent hip surgery and did fine with it, patient is a low risk candidate for surgery  -Paper work filled out clearing him for surgery stating he is a low risk candidate  -No need to repeat lab work at this time (however ok to repeat lab work right before surgery at surgeon's discretion), as hematuria has improved and creatinine was down trending on previous BMP       Counseled regarding above diagnosis, including possible risks and complications,  especially if left uncontrolled. Counseled regarding the possible side effects, risks, benefits and alternatives to treatment;patient and/or guardian verbalizes understanding, agrees, feels comfortable with and wishes to proceed with above treatment plan. Call or go to [address] if symptoms worsen or persist. Advised patient to call with any new medication issues, and, as applicable, read all Rx info from pharmacy to assure aware of all possible risks and side effects of medicationbefore taking. Patient and/or guardian given opportunity to ask questions/raise concerns.   The patient verbalized comfort and understanding ofinstructions. I encourage further reading and education about your health conditions. Information on many health conditions is provided by [facility] [employer] of Family Physicians: [email or web address]  Please bring any questions to me at your nextvisit. Return to Office: Return in about 6 months (around 7/15/2022) for f/u for hip pain . Medication List:    No current outpatient medications on file. No current facility-administered medications for this visit. Hema Ahumada,        This document may have been prepared at least partially through the use of voice recognition software. Although effort is taken to assure the accuracy ofthis document, it is possible that grammatical, syntax,  or spelling errors may occur.

## 2022-01-15 ENCOUNTER — OFFICE VISIT (OUTPATIENT)
Dept: PRIMARY CARE CLINIC | Age: 72
End: 2022-01-15
Payer: MEDICARE

## 2022-01-15 VITALS
SYSTOLIC BLOOD PRESSURE: 130 MMHG | RESPIRATION RATE: 18 BRPM | HEIGHT: 66 IN | TEMPERATURE: 96.5 F | OXYGEN SATURATION: 99 % | DIASTOLIC BLOOD PRESSURE: 66 MMHG | BODY MASS INDEX: 27.64 KG/M2 | WEIGHT: 172 LBS

## 2022-01-15 DIAGNOSIS — Z01.818 PRE-OP EXAMINATION: Primary | ICD-10-CM

## 2022-01-15 PROBLEM — I95.1 ORTHOSTATIC HYPOTENSION: Status: RESOLVED | Noted: 2021-12-04 | Resolved: 2022-01-15

## 2022-01-15 PROBLEM — R06.09 DOE (DYSPNEA ON EXERTION): Status: RESOLVED | Noted: 2021-11-12 | Resolved: 2022-01-15

## 2022-01-15 PROBLEM — R31.0 GROSS HEMATURIA: Status: RESOLVED | Noted: 2021-12-04 | Resolved: 2022-01-15

## 2022-01-15 PROBLEM — S72.001A CLOSED FRACTURE OF RIGHT HIP (HCC): Status: RESOLVED | Noted: 2021-10-09 | Resolved: 2022-01-15

## 2022-01-15 PROCEDURE — 99214 OFFICE O/P EST MOD 30 MIN: CPT | Performed by: STUDENT IN AN ORGANIZED HEALTH CARE EDUCATION/TRAINING PROGRAM

## 2022-01-15 PROCEDURE — 4004F PT TOBACCO SCREEN RCVD TLK: CPT | Performed by: STUDENT IN AN ORGANIZED HEALTH CARE EDUCATION/TRAINING PROGRAM

## 2022-01-15 PROCEDURE — G8484 FLU IMMUNIZE NO ADMIN: HCPCS | Performed by: STUDENT IN AN ORGANIZED HEALTH CARE EDUCATION/TRAINING PROGRAM

## 2022-01-15 PROCEDURE — 4040F PNEUMOC VAC/ADMIN/RCVD: CPT | Performed by: STUDENT IN AN ORGANIZED HEALTH CARE EDUCATION/TRAINING PROGRAM

## 2022-01-15 PROCEDURE — 1123F ACP DISCUSS/DSCN MKR DOCD: CPT | Performed by: STUDENT IN AN ORGANIZED HEALTH CARE EDUCATION/TRAINING PROGRAM

## 2022-01-15 PROCEDURE — G8427 DOCREV CUR MEDS BY ELIG CLIN: HCPCS | Performed by: STUDENT IN AN ORGANIZED HEALTH CARE EDUCATION/TRAINING PROGRAM

## 2022-01-15 PROCEDURE — 3017F COLORECTAL CA SCREEN DOC REV: CPT | Performed by: STUDENT IN AN ORGANIZED HEALTH CARE EDUCATION/TRAINING PROGRAM

## 2022-01-15 PROCEDURE — G8417 CALC BMI ABV UP PARAM F/U: HCPCS | Performed by: STUDENT IN AN ORGANIZED HEALTH CARE EDUCATION/TRAINING PROGRAM

## 2022-01-26 ENCOUNTER — HOSPITAL ENCOUNTER (OUTPATIENT)
Age: 72
Discharge: HOME | End: 2022-01-28

## 2022-01-26 PROCEDURE — 88342 IMHCHEM/IMCYTCHM 1ST ANTB: CPT

## 2022-01-26 PROCEDURE — 88305 TISSUE EXAM BY PATHOLOGIST: CPT

## 2022-02-02 ENCOUNTER — TELEPHONE (OUTPATIENT)
Dept: PRIMARY CARE CLINIC | Age: 72
End: 2022-02-02
Payer: MEDICARE

## 2022-02-02 DIAGNOSIS — N39.0 URINARY TRACT INFECTION WITH HEMATURIA, SITE UNSPECIFIED: Primary | ICD-10-CM

## 2022-02-02 DIAGNOSIS — R30.0 DYSURIA: ICD-10-CM

## 2022-02-02 DIAGNOSIS — N39.0 URINARY TRACT INFECTION WITH HEMATURIA, SITE UNSPECIFIED: ICD-10-CM

## 2022-02-02 DIAGNOSIS — R35.0 URINARY FREQUENCY: ICD-10-CM

## 2022-02-02 DIAGNOSIS — R31.9 URINARY TRACT INFECTION WITH HEMATURIA, SITE UNSPECIFIED: ICD-10-CM

## 2022-02-02 DIAGNOSIS — R31.9 URINARY TRACT INFECTION WITH HEMATURIA, SITE UNSPECIFIED: Primary | ICD-10-CM

## 2022-02-02 LAB
BILIRUBIN, POC: NEGATIVE
BLOOD URINE, POC: POSITIVE
CLARITY, POC: NORMAL
COLOR, POC: YELLOW
GLUCOSE URINE, POC: NEGATIVE
KETONES, POC: NEGATIVE
LEUKOCYTE EST, POC: POSITIVE
NITRITE, POC: POSITIVE
PH, POC: 5.5
PROTEIN, POC: NORMAL
SPECIFIC GRAVITY, POC: 1.02
UROBILINOGEN, POC: 0.2

## 2022-02-02 PROCEDURE — 81002 URINALYSIS NONAUTO W/O SCOPE: CPT | Performed by: STUDENT IN AN ORGANIZED HEALTH CARE EDUCATION/TRAINING PROGRAM

## 2022-02-02 RX ORDER — CIPROFLOXACIN 500 MG/1
500 TABLET, FILM COATED ORAL 2 TIMES DAILY
Qty: 14 TABLET | Refills: 0 | Status: SHIPPED | OUTPATIENT
Start: 2022-02-02 | End: 2022-02-09

## 2022-02-02 NOTE — TELEPHONE ENCOUNTER
UA highly suggestive of UTI, did just recently have catheter in and prostate surgery, will start on ciprofloxacin and send urine culture.       Thank Shannan Pelayo

## 2022-02-04 LAB
ORGANISM: ABNORMAL
URINE CULTURE, ROUTINE: ABNORMAL

## 2022-02-07 ENCOUNTER — TELEPHONE (OUTPATIENT)
Dept: PRIMARY CARE CLINIC | Age: 72
End: 2022-02-07
Payer: MEDICARE

## 2022-02-07 DIAGNOSIS — R30.0 DYSURIA: Primary | ICD-10-CM

## 2022-02-07 DIAGNOSIS — R30.0 DYSURIA: ICD-10-CM

## 2022-02-07 LAB
BILIRUBIN, POC: NEGATIVE
BLOOD URINE, POC: POSITIVE
CLARITY, POC: NORMAL
COLOR, POC: YELLOW
GLUCOSE URINE, POC: NEGATIVE
KETONES, POC: NEGATIVE
LEUKOCYTE EST, POC: NEGATIVE
NITRITE, POC: NEGATIVE
PH, POC: 5.5
PROTEIN, POC: POSITIVE
SPECIFIC GRAVITY, POC: 1.02
UROBILINOGEN, POC: 0.2

## 2022-02-07 PROCEDURE — 81002 URINALYSIS NONAUTO W/O SCOPE: CPT | Performed by: STUDENT IN AN ORGANIZED HEALTH CARE EDUCATION/TRAINING PROGRAM

## 2022-02-07 NOTE — TELEPHONE ENCOUNTER
Advise he finish his abx coarse. UA looks non infectious. Will run urine culture to make sure. He can call the urologist to see their thoughts also as bleeding can last for a little while after his procedure.     Thank Leonard Horn

## 2022-02-10 LAB — URINE CULTURE, ROUTINE: NORMAL

## 2022-02-21 ENCOUNTER — TELEPHONE (OUTPATIENT)
Dept: PRIMARY CARE CLINIC | Age: 72
End: 2022-02-21
Payer: MEDICARE

## 2022-02-21 DIAGNOSIS — N30.01 ACUTE CYSTITIS WITH HEMATURIA: Primary | ICD-10-CM

## 2022-02-21 DIAGNOSIS — R30.0 DYSURIA: Primary | ICD-10-CM

## 2022-02-21 DIAGNOSIS — N30.01 ACUTE CYSTITIS WITH HEMATURIA: ICD-10-CM

## 2022-02-21 LAB
BILIRUBIN, POC: NEGATIVE
BLOOD URINE, POC: POSITIVE
CLARITY, POC: NORMAL
COLOR, POC: YELLOW
GLUCOSE URINE, POC: NEGATIVE
KETONES, POC: NEGATIVE
LEUKOCYTE EST, POC: POSITIVE
NITRITE, POC: POSITIVE
PH, POC: 5.5
PROTEIN, POC: 30
SPECIFIC GRAVITY, POC: 1.03
UROBILINOGEN, POC: 0.2

## 2022-02-21 PROCEDURE — 81002 URINALYSIS NONAUTO W/O SCOPE: CPT | Performed by: STUDENT IN AN ORGANIZED HEALTH CARE EDUCATION/TRAINING PROGRAM

## 2022-02-21 RX ORDER — SULFAMETHOXAZOLE AND TRIMETHOPRIM 800; 160 MG/1; MG/1
1 TABLET ORAL 2 TIMES DAILY
Qty: 20 TABLET | Refills: 0 | Status: SHIPPED | OUTPATIENT
Start: 2022-02-21 | End: 2022-03-03

## 2022-02-21 NOTE — TELEPHONE ENCOUNTER
UA showed results suggestive of UTI, abx sent to pharmacy and urine culture ordered.       Thank Familia Warner

## 2022-02-23 LAB
ORGANISM: ABNORMAL
URINE CULTURE, ROUTINE: ABNORMAL

## 2022-03-24 ENCOUNTER — TELEPHONE (OUTPATIENT)
Dept: PRIMARY CARE CLINIC | Age: 72
End: 2022-03-24
Payer: MEDICARE

## 2022-03-24 DIAGNOSIS — R35.0 URINARY FREQUENCY: ICD-10-CM

## 2022-03-24 DIAGNOSIS — Z87.81 S/P RIGHT HIP FRACTURE: Primary | ICD-10-CM

## 2022-03-24 DIAGNOSIS — R30.0 DYSURIA: ICD-10-CM

## 2022-03-24 LAB
BILIRUBIN, POC: NEGATIVE
BLOOD URINE, POC: POSITIVE
CLARITY, POC: NORMAL
COLOR, POC: YELLOW
GLUCOSE URINE, POC: NEGATIVE
KETONES, POC: NEGATIVE
LEUKOCYTE EST, POC: POSITIVE
NITRITE, POC: POSITIVE
PH, POC: 6
PROTEIN, POC: POSITIVE
SPECIFIC GRAVITY, POC: 1.03
UROBILINOGEN, POC: 0.2

## 2022-03-24 PROCEDURE — 81002 URINALYSIS NONAUTO W/O SCOPE: CPT | Performed by: STUDENT IN AN ORGANIZED HEALTH CARE EDUCATION/TRAINING PROGRAM

## 2022-03-24 NOTE — TELEPHONE ENCOUNTER
Patient called requesting a handicap placard script due to hip replacement. States it will be lifetime and to write a comment of \"number 6\" which is severly limited in the ability to walk due to an orthopedic condition.

## 2022-03-24 NOTE — TELEPHONE ENCOUNTER
Patient called complaining of dysuria and urinary frequency. Ordered UA and urine culture to send out       Patient returned call and states that he made an appointment with his urologist because the last two medications did not help.

## 2022-03-26 LAB
ORGANISM: ABNORMAL
URINE CULTURE, ROUTINE: ABNORMAL

## 2022-03-27 LAB
ORGANISM: ABNORMAL
URINE CULTURE, ROUTINE: ABNORMAL

## 2022-05-17 ENCOUNTER — TELEPHONE (OUTPATIENT)
Dept: ORTHOPEDIC SURGERY | Age: 72
End: 2022-05-17

## 2022-05-17 NOTE — TELEPHONE ENCOUNTER
Pt called stated in the last week he has had increasing pain in his rt hip (no new injury). DOS 10-09-21 TTS. LOV 11-22-21. Pt is having difficulty walking.   Please call pt with any recommendations [phone]

## 2022-05-19 NOTE — TELEPHONE ENCOUNTER
Call placed to patient, appointment made at this time.     Future Appointments   Date Time Provider Sander Dc   6/1/2022 10:00 AM Shan Ortega MD [facility]

## 2022-05-24 ENCOUNTER — TELEPHONE (OUTPATIENT)
Dept: ORTHOPEDIC SURGERY | Age: 72
End: 2022-05-24

## 2022-05-24 DIAGNOSIS — Z96.641 HISTORY OF RIGHT HIP HEMIARTHROPLASTY: Primary | ICD-10-CM

## 2022-06-01 ENCOUNTER — HOSPITAL ENCOUNTER (OUTPATIENT)
Dept: GENERAL RADIOLOGY | Age: 72
Discharge: HOME | End: 2022-06-03
Payer: MEDICARE

## 2022-06-01 ENCOUNTER — OFFICE VISIT (OUTPATIENT)
Dept: ORTHOPEDIC SURGERY | Age: 72
End: 2022-06-01
Payer: MEDICARE

## 2022-06-01 VITALS — WEIGHT: 180 LBS | HEIGHT: 66 IN | BODY MASS INDEX: 28.93 KG/M2

## 2022-06-01 DIAGNOSIS — Z96.641 HISTORY OF RIGHT HIP HEMIARTHROPLASTY: ICD-10-CM

## 2022-06-01 DIAGNOSIS — S72.001A CLOSED FRACTURE OF RIGHT HIP, INITIAL ENCOUNTER (HCC): Primary | ICD-10-CM

## 2022-06-01 DIAGNOSIS — N18.31 STAGE 3A CHRONIC KIDNEY DISEASE (HCC): ICD-10-CM

## 2022-06-01 PROBLEM — N18.30 CHRONIC RENAL DISEASE, STAGE III (HCC): Status: ACTIVE | Noted: 2022-06-01

## 2022-06-01 PROCEDURE — G8427 DOCREV CUR MEDS BY ELIG CLIN: HCPCS | Performed by: ORTHOPAEDIC SURGERY

## 2022-06-01 PROCEDURE — 99213 OFFICE O/P EST LOW 20 MIN: CPT | Performed by: ORTHOPAEDIC SURGERY

## 2022-06-01 PROCEDURE — 99212 OFFICE O/P EST SF 10 MIN: CPT

## 2022-06-01 PROCEDURE — 3017F COLORECTAL CA SCREEN DOC REV: CPT | Performed by: ORTHOPAEDIC SURGERY

## 2022-06-01 PROCEDURE — 4004F PT TOBACCO SCREEN RCVD TLK: CPT | Performed by: ORTHOPAEDIC SURGERY

## 2022-06-01 PROCEDURE — 73502 X-RAY EXAM HIP UNI 2-3 VIEWS: CPT

## 2022-06-01 PROCEDURE — 1123F ACP DISCUSS/DSCN MKR DOCD: CPT | Performed by: ORTHOPAEDIC SURGERY

## 2022-06-01 PROCEDURE — G8417 CALC BMI ABV UP PARAM F/U: HCPCS | Performed by: ORTHOPAEDIC SURGERY

## 2022-06-01 NOTE — PROGRESS NOTES
Patient here for right hip pain. Patient did have Right displaced femoral neck fracture surgery done on 10/09/2021. Patient states after his surgery he completed physical therapy at that time. Patient states that when he does long distance walking or sitting for long period of time, the hip pain flares up. Patient states that he will use a cane for assistance when he walks long distance.           Electronically signed by Roman Ballesteros MA on 6/1/2022 at 9:52 AM

## 2022-06-01 NOTE — PROGRESS NOTES
Chief Complaint   Patient presents with    Hip Pain     right hip       SUBJECTIVE: Patient is a pleasant 51-year-old male who is about 8 months out from a right hip hemiarthroplasty for femoral neck fracture. Patient's been developing pain around his right hip especially lateral and posterior after walking long distances. He is comfortable at rest.  He is also comfortable in the mornings after he does activity all day get some aching and discomfort. He states the pain is tolerable but he would make sure nothing was broken or loose. He has not seen anybody else about this recently. He denies any falls or traumas. He has not tried any treatment up until this point. History reviewed. No pertinent past medical history. Past Surgical History:   Procedure Laterality Date    HIP SURGERY Right 10/9/2021    RIGHT HIP HEMIARTHROPLASTY performed by Doris Nunes MD at [facility] OR     Family History   Problem Relation Age of Onset    Other Mother         uterine cancer    Other Maternal Grandfather [de-identified]        heart attacks     Social History     Tobacco Use    Smoking status: Former Smoker     Packs/day: 1.00     Years: 20.00     Pack years: 20.00     Quit date:      Years since quittin.4    Smokeless tobacco: Current User     Types: Chew   Substance Use Topics    Alcohol use: Yes     Comment: couple glasses daily     Drug use: Not Currently     Allergies   Allergen Reactions    Shellfish-Derived Products          Review of Systems   Constitutional: Negative for fever, chills, diaphoresis, appetite change and fatigue. HENT: Negative for dental issues, hearing loss and tinnitus. Negative for congestion, sinus pressure, sneezing, sore throat. Negative for headache. Eyes: Negative for visual disturbance, blurred and double vision. Negative for pain, discharge, redness and itching  Respiratory: Negative for cough, shortness of breath and wheezing.    Cardiovascular: Negative for chest pain, palpitations and leg swelling. No dyspnea on exertion   Gastrointestinal:   Negative for nausea, vomiting, abdominal pain, diarrhea, constipation  or black or bloody. Hematologic\Lymphatic:  negative for bleeding, petechiae,   Genitourinary: Negative for hematuria and difficulty urinating. Musculoskeletal: Negative for neck pain and stiffness. Negative for back pain, see HPI  Skin: Negative for pallor, rash and wound. Neurological: Negative for dizziness, tremors, seizures, weakness, light-headedness, no TIA or stroke symptoms. No numbness and headaches. Psychiatric/Behavioral: Negative. OBJECTIVE:      Physical Examination:   General appearance: alert, well appearing, and in no distress,  normal appearing weight. No visible signs of trauma   Mental status: alert, oriented to person, place, and time, normal mood, behavior, speech, dress, motor activity, and thought processes  Abdomen: soft, nondistended  Resp:   resp easy and unlabored, no audible wheezes note, normal symmetrical expansion of both hemithoraces  Cardiac: distal pulses palpable, skin and extremities well perfused  Neurological: alert, oriented X3, normal speech, no focal findings or movement disorder noted, motor and sensory grossly normal bilaterally, normal muscle tone, no tremors, strength 5/5, normal gait and station  HEENT: normochephalic atraumatic, external ears and eyes normal, sclera normal, neck supple, no nasal discharge.    Extremities:   peripheral pulses normal, no edema, redness or tenderness in the calves   Skin: normal coloration, no rashes or open wounds, no suspicious skin lesions noted  Psych: Affect euthymic   Musculoskeletal:   Extremity:  Right hip    Skin intact, surgical incision well-healed  Compartment soft compressible  Calf soft nontender  No crepitus or clunking with range of motion of the right hip or elicited groin pain  No exquisite tenderness over the greater trochanter  Some muscular pain around the abductors  Patient has good strength on gait with no abductor lurch  Patient is walking without assistance  5/5 L4, L5, and S1 nerve roots  Gross sensation intact distally  Capillary refill less than 3 seconds  2/4 dorsalis pedis and posterior tibial pulses        Ht 5' 6\" (1.676 m)   Wt 180 lb (81.6 kg)   BMI 29.05 kg/m²      XR: 6/1/22   X-ray of the right hip today reveals that the prosthesis is stable with no signs of loosening or subsidence. Patient does have some narrowed joint space although has not changed since postop in the right hip         ASSESSMENT:     Diagnosis Orders   1. Closed fracture of right hip, initial encounter ([facility] [address]     2. Stage 3a chronic kidney disease ([facility] [address]          Discussion: Talk to the patient in detail about conservative treatment. I did go over his x-rays with him today and counseled him on conservative therapy. I explained we would try topical NSAIDs due to his kidney disease as well as physical therapy. He stated that he would like to do physical therapy through the [facility] because of his benefits. I think this is very reasonable. I will send a note to his primary care provider for referral if need be. Otherwise I will do a prescription as well. He will need range of motion, strengthening, and gait training and he is weight-bear as tolerated right lower extremity. He will also need modalities as needed. He is agreeable with the plan. I will see him back in 8 weeks time sooner if he has issues.     PLAN:  [facility]qua therapy at [facility]    Follow-up in 8 weeks    Potential trying of Voltaren topical gel    Call with any questions or concerns      ELECTRONICALLY signed by:    Mitra Ghotra MD  6/1/22

## 2022-06-01 NOTE — PATIENT INSTRUCTIONS
Therapy at the [facility]    Will send note to your doctor    Follow-up here in about 8 weeks, call sooner with any questions or concerns          Diclofenac Gel (Voltaren Gel) has recently become available over the counter for purchase without a prescription. This is the same strength and dosing as prescription strength. If this medication is prescribed and the copay is too expensive it may be less to buy over the counter.  Please ask your pharmacist.

## 2022-11-01 ENCOUNTER — TELEMEDICINE (OUTPATIENT)
Dept: PRIMARY CARE CLINIC | Age: 72
End: 2022-11-01
Payer: MEDICARE

## 2022-11-01 DIAGNOSIS — Z00.00 INITIAL MEDICARE ANNUAL WELLNESS VISIT: Primary | ICD-10-CM

## 2022-11-01 PROCEDURE — 1123F ACP DISCUSS/DSCN MKR DOCD: CPT | Performed by: STUDENT IN AN ORGANIZED HEALTH CARE EDUCATION/TRAINING PROGRAM

## 2022-11-01 PROCEDURE — 3017F COLORECTAL CA SCREEN DOC REV: CPT | Performed by: STUDENT IN AN ORGANIZED HEALTH CARE EDUCATION/TRAINING PROGRAM

## 2022-11-01 PROCEDURE — G0438 PPPS, INITIAL VISIT: HCPCS | Performed by: STUDENT IN AN ORGANIZED HEALTH CARE EDUCATION/TRAINING PROGRAM

## 2022-11-01 PROCEDURE — G8484 FLU IMMUNIZE NO ADMIN: HCPCS | Performed by: STUDENT IN AN ORGANIZED HEALTH CARE EDUCATION/TRAINING PROGRAM

## 2022-11-01 ASSESSMENT — LIFESTYLE VARIABLES
HOW OFTEN DURING THE LAST YEAR HAVE YOU BEEN UNABLE TO REMEMBER WHAT HAPPENED THE NIGHT BEFORE BECAUSE YOU HAD BEEN DRINKING: 0
HAS A RELATIVE, FRIEND, DOCTOR, OR ANOTHER HEALTH PROFESSIONAL EXPRESSED CONCERN ABOUT YOUR DRINKING OR SUGGESTED YOU CUT DOWN: 0
HOW OFTEN DURING THE LAST YEAR HAVE YOU FAILED TO DO WHAT WAS NORMALLY EXPECTED FROM YOU BECAUSE OF DRINKING: 0
HOW OFTEN DURING THE LAST YEAR HAVE YOU NEEDED AN ALCOHOLIC DRINK FIRST THING IN THE MORNING TO GET YOURSELF GOING AFTER A NIGHT OF HEAVY DRINKING: 0
HOW MANY STANDARD DRINKS CONTAINING ALCOHOL DO YOU HAVE ON A TYPICAL DAY: 1 OR 2
HAVE YOU OR SOMEONE ELSE BEEN INJURED AS A RESULT OF YOUR DRINKING: 0
HOW OFTEN DURING THE LAST YEAR HAVE YOU HAD A FEELING OF GUILT OR REMORSE AFTER DRINKING: 0
HOW OFTEN DO YOU HAVE A DRINK CONTAINING ALCOHOL: 4 OR MORE TIMES A WEEK
HOW OFTEN DURING THE LAST YEAR HAVE YOU FOUND THAT YOU WERE NOT ABLE TO STOP DRINKING ONCE YOU HAD STARTED: 0

## 2022-11-01 ASSESSMENT — PATIENT HEALTH QUESTIONNAIRE - PHQ9
1. LITTLE INTEREST OR PLEASURE IN DOING THINGS: 0
SUM OF ALL RESPONSES TO PHQ9 QUESTIONS 1 & 2: 0
SUM OF ALL RESPONSES TO PHQ QUESTIONS 1-9: 0
2. FEELING DOWN, DEPRESSED OR HOPELESS: 0
SUM OF ALL RESPONSES TO PHQ QUESTIONS 1-9: 0

## 2022-11-01 NOTE — PROGRESS NOTES
Medicare Annual Wellness Visit    Kris Hyde is here for Medicare AWV    Assessment & Plan   Initial Medicare annual wellness visit  Questions and concerns answered and addressed     Recommendations for Preventive Services Due: see orders and patient instructions/AVS.  Recommended screening schedule for the next 5-10 years is provided to the patient in written form: see Patient Instructions/AVS.     Return in about 1 year (around 11/1/2023), or if symptoms worsen or fail to improve, for AWV . Subjective   The following acute and/or chronic problems were also addressed today:  No specific concerns today, some lingering hip pain from previous fracture and is tolerating this well, urinary issues have improved, moving bowels better now     Patient's complete Health Risk Assessment and screening values have been reviewed and are found in Flowsheets. The following problems were reviewed today and where indicated follow up appointments were made and/or referrals ordered.     Positive Risk Factor Screenings with Interventions:    Fall Risk:  Do you feel unsteady or are you worried about falling? : (!) yes  2 or more falls in past year?: no  Fall with injury in past year?: no   Fall Risk Interventions:    Home safety tips provided  Home exercises provided to promote strength and balance      Tobacco Use:  Tobacco Use: High Risk    Smoking Tobacco Use: Former    Smokeless Tobacco Use: Current    Passive Exposure: Not on file     E-cigarette/Vaping       Questions Responses    E-cigarette/Vaping Use     Start Date     Passive Exposure     Quit Date     Counseling Given     Comments           Substance Use - Tobacco Interventions:  Not smoking anymore     Alcohol Screening:  Alcohol Use: Heavy Drinker    Frequency of Alcohol Consumption: 4 or more times a week    Average Number of Drinks: 1 or 2    Frequency of Binge Drinking: Weekly     AUDIT-C Score: 7  AUDIT Total Score: 7  An AUDIT-C score of 3-7 indicates potential alcohol risk. An AUDIT Total score of 8 or more is associated with harmful or hazardous drinking. A score of 13 or more in women, and 15 or more in men, is likely to indicate alcohol dependence. Substance Use - Alcohol Interventions:  Reviewed recommended alcohol consumption guidelines with the patient        General Health and ACP:  General  In general, how would you say your health is?: Good  In the past 7 days, have you experienced any of the following: New or Increased Pain, New or Increased Fatigue, Loneliness, Social Isolation, Stress or Anger?: No  Do you get the social and emotional support that you need?: Yes  Do you have a Living Will?: Yes    Advance Directives       Power of  Living Will ACP-Advance Directive ACP-Power of     Not on File Not on File Filed Elyse Mcdermott Risk Interventions:  Doing well     Health Habits/Nutrition:  Physical Activity: Inactive    Days of Exercise per Week: 0 days    Minutes of Exercise per Session: 0 min     Have you lost any weight without trying in the past 3 months?: No     Have you seen the dentist within the past year?: (!) No  Health Habits/Nutrition Interventions:  Dental exam overdue:  patient encouraged to make appointment with his/her dentist    Hearing/Vision:  Do you or your family notice any trouble with your hearing that hasn't been managed with hearing aids?: No  Do you have difficulty driving, watching TV, or doing any of your daily activities because of your eyesight?: No  Have you had an eye exam within the past year?: (!) No  No results found.   Hearing/Vision Interventions:  Vision concerns:  patient encouraged to make appointment with his/her eye specialist            Objective      Patient-Reported Vitals  No data recorded          Allergies   Allergen Reactions    Shellfish-Derived Products      Prior to Visit Medications    Not on File       CareTeam (Including outside providers/suppliers regularly involved in providing care):   Patient Care Team:  Ronaldo Amaya DO as PCP - General (Family Medicine)  Ronaldo Amaya DO as PCP - [facility] Empaneled Provider     Reviewed and updated this visit:  Sarah Sosa., was evaluated through a synchronous (real-time) audio-video encounter. The patient (or guardian if applicable) is aware that this is a billable service, which includes applicable co-pays. This Virtual Visit was conducted with patient's (and/or legal guardian's) consent. The visit was conducted pursuant to the emergency declaration under the [facility], [address] [place] [address] authority and the [employer] General Act. Patient identification was verified, and a caregiver was present when appropriate. The patient was located at Home: [address]. Provider was located at [facility] (Appt Dept): [address] [place] [address].

## 2022-11-01 NOTE — PATIENT INSTRUCTIONS
Personalized Preventive Plan for [place] 11/1/2022  Medicare offers a range of preventive health benefits. Some of the tests and screenings are paid in full while other may be subject to a deductible, co-insurance, and/or copay. Some of these benefits include a comprehensive review of your medical history including lifestyle, illnesses that may run in your family, and various assessments and screenings as appropriate. After reviewing your medical record and screening and assessments performed today your provider may have ordered immunizations, labs, imaging, and/or referrals for you. A list of these orders (if applicable) as well as your Preventive Care list are included within your After Visit Summary for your review. Other Preventive Recommendations:    A preventive eye exam performed by an eye specialist is recommended every 1-2 years to screen for glaucoma; cataracts, macular degeneration, and other eye disorders. A preventive dental visit is recommended every 6 months. Try to get at least 150 minutes of exercise per week or 10,000 steps per day on a pedometer . Order or download the FREE \"Exercise & Physical Activity: Your Everyday Guide\" from [employer]. Call 4-326.173.6841 or search [employer] online. You need 6215-9563 mg of calcium and 9831-4370 IU of vitamin D per day. It is possible to meet your calcium requirement with diet alone, but a vitamin D supplement is usually necessary to meet this goal.  When exposed to the sun, use a sunscreen that protects against both UVA and UVB radiation with an SPF of 30 or greater. Reapply every 2 to 3 hours or after sweating, drying off with a towel, or swimming. Always wear a seat belt when traveling in a car. Always wear a helmet when riding a bicycle or motorcycle.

## 2022-11-14 DIAGNOSIS — Z12.11 COLON CANCER SCREENING: Primary | ICD-10-CM

## 2023-01-31 ENCOUNTER — COMMUNITY OUTREACH (OUTPATIENT)
Dept: PRIMARY CARE CLINIC | Age: 73
End: 2023-01-31

## 2023-06-06 NOTE — TELEPHONE ENCOUNTER
Routed to Providers for consideration and scheduling recommendations. Transitional planning-plan home. Needs Medical cab from [facility]. Cath on hold.

## 2024-03-04 ENCOUNTER — COMMUNITY OUTREACH (OUTPATIENT)
Dept: PRIMARY CARE CLINIC | Age: 74
End: 2024-03-04

## (undated) DEVICE — GOWN,BREATHABLE SLV,AURORA,XLG,STRL: Brand: MEDLINE

## (undated) DEVICE — BLADE CLIPPER GEN PURP NS

## (undated) DEVICE — GOWN,AURORA,BRTHSLV,2XL,18/CS: Brand: MEDLINE

## (undated) DEVICE — Device

## (undated) DEVICE — ELECTRODE PT RET AD L9FT HI MOIST COND ADH HYDRGEL CORDED

## (undated) DEVICE — TOWEL,OR,DSP,ST,BLUE,DLX,10/PK,8PK/CS: Brand: MEDLINE

## (undated) DEVICE — 3M™ STERI-DRAPE™ U-DRAPE 1015: Brand: STERI-DRAPE™

## (undated) DEVICE — SET ORTHO ACCOLADE TOTAL HIP BROACH

## (undated) DEVICE — SET ORTHO ACCOLADE TOTAL HIP INSRTION

## (undated) DEVICE — HANDPIECE SET WITH BONE CLEANING TIP AND SUCTION TUBE: Brand: INTERPULSE

## (undated) DEVICE — DRIP REDUCTION MANIFOLD

## (undated) DEVICE — SOLUTION IV IRRIG POUR BRL 0.9% SODIUM CHL 2F7124

## (undated) DEVICE — CLOTH SURG PREP PREOPERATIVE CHLORHEXIDINE GLUC 2% READYPREP

## (undated) DEVICE — Z DISCONTINUED PER MEDLINE USE 2741944 DRESSING AQUACEL 12 IN SURG W9XL30CM SIL CVR WTRPRF VIR BACT BARR ANTIMIC

## (undated) DEVICE — SET ORTHO STD STORTSTD1

## (undated) DEVICE — TOTAL HIP PK

## (undated) DEVICE — STRYKER PERFORMANCE SERIES SAGITTAL BLADE: Brand: STRYKER PERFORMANCE SERIES

## (undated) DEVICE — DRILL SYSTEM 7

## (undated) DEVICE — SET ORTHO ACCOLADE HEMI HIP BROACH

## (undated) DEVICE — RACK TUBE CURETTE

## (undated) DEVICE — 1000 S-DRAPE TOWEL DRAPE 10/BX: Brand: STERI-DRAPE™

## (undated) DEVICE — APPLICATOR PREP 26ML 0.7% IOD POVACRYLEX 74% ISO ALC ST

## (undated) DEVICE — SET LAMBOTTE

## (undated) DEVICE — Z DISCONTINUED USE 2272117 DRAPE SURG 3 QTR N INVASIVE 2 LAYR DISP

## (undated) DEVICE — 3M™ IOBAN™ 2 ANTIMICROBIAL INCISE DRAPE 6650EZ: Brand: IOBAN™ 2

## (undated) DEVICE — GLOVE ORTHO 8   MSG9480

## (undated) DEVICE — SET ORTHO STD STORTSTD2

## (undated) DEVICE — REAMER ACET

## (undated) DEVICE — SOLUTION IRRIG 3000ML 0.9% SOD CHL USP UROMATIC PLAS CONT

## (undated) DEVICE — GLOVE SURG SZ 8 L12IN FNGR THK79MIL GRN LTX FREE

## (undated) DEVICE — SYRINGE MED 50ML LUERLOCK TIP

## (undated) DEVICE — SURGICAL PROCEDURE PACK BASIC

## (undated) DEVICE — GLOVE ORANGE PI 8   MSG9080

## (undated) DEVICE — SPECIMEN COLLECTION 4-PORT MANIFOLD: Brand: NEPTUNE 2

## (undated) DEVICE — SOLUTION IV IRRIG 1000ML POUR BTL 2F7114

## (undated) DEVICE — SET ORTHO ACCOLADE HEMI HIP INSRT

## (undated) DEVICE — SET ORTHO CENTRAX CUPS

## (undated) DEVICE — NEEDLE HYPO 18GA L1.5IN PNK POLYPR HUB S STL REG BVL STR

## (undated) DEVICE — RETRACTOR KNE PCA

## (undated) DEVICE — 3M™ IOBAN™ 2 ANTIMICROBIAL INCISE DRAPE 6651EZ: Brand: IOBAN™ 2

## (undated) DEVICE — SET ORTHO TRI CERAMIC ACET INST